# Patient Record
Sex: MALE | Race: WHITE | Employment: OTHER | URBAN - NONMETROPOLITAN AREA
[De-identification: names, ages, dates, MRNs, and addresses within clinical notes are randomized per-mention and may not be internally consistent; named-entity substitution may affect disease eponyms.]

---

## 2017-12-13 ENCOUNTER — HOSPITAL ENCOUNTER (OUTPATIENT)
Age: 27
Discharge: HOME | End: 2017-12-13
Payer: MEDICARE

## 2017-12-13 LAB — TSH SERPL DL<=0.05 MIU/L-ACNC: 1 UIU/ML (ref 0.4–4.2)

## 2017-12-13 PROCEDURE — 36415 COLL VENOUS BLD VENIPUNCTURE: CPT

## 2017-12-13 PROCEDURE — 84443 ASSAY THYROID STIM HORMONE: CPT

## 2018-01-17 ENCOUNTER — HOSPITAL ENCOUNTER (EMERGENCY)
Age: 28
Discharge: ANOTHER HOSPITAL | End: 2018-01-17
Payer: OTHER GOVERNMENT

## 2018-01-17 ENCOUNTER — APPOINTMENT (OUTPATIENT)
Dept: GENERAL RADIOLOGY | Age: 28
End: 2018-01-17
Payer: OTHER GOVERNMENT

## 2018-01-17 VITALS
WEIGHT: 178 LBS | HEART RATE: 72 BPM | RESPIRATION RATE: 17 BRPM | OXYGEN SATURATION: 97 % | TEMPERATURE: 98.7 F | BODY MASS INDEX: 27.94 KG/M2 | HEIGHT: 67 IN | DIASTOLIC BLOOD PRESSURE: 66 MMHG | SYSTOLIC BLOOD PRESSURE: 100 MMHG

## 2018-01-17 DIAGNOSIS — I20.0 UNSTABLE ANGINA (HCC): Primary | ICD-10-CM

## 2018-01-17 LAB
ANION GAP SERPL CALCULATED.3IONS-SCNC: 11 MEQ/L (ref 8–16)
APTT: 27.3 SECONDS (ref 22–38)
BASOPHILS # BLD: 0.3 %
BASOPHILS ABSOLUTE: 0 THOU/MM3 (ref 0–0.1)
BUN BLDV-MCNC: 13 MG/DL (ref 7–22)
CALCIUM SERPL-MCNC: 9.3 MG/DL (ref 8.5–10.5)
CHLORIDE BLD-SCNC: 103 MEQ/L (ref 98–111)
CO2: 24 MEQ/L (ref 23–33)
CREAT SERPL-MCNC: 1 MG/DL (ref 0.4–1.2)
EKG ATRIAL RATE: 72 BPM
EKG P AXIS: 20 DEGREES
EKG P-R INTERVAL: 182 MS
EKG Q-T INTERVAL: 396 MS
EKG QRS DURATION: 116 MS
EKG QTC CALCULATION (BAZETT): 433 MS
EKG R AXIS: -41 DEGREES
EKG T AXIS: 48 DEGREES
EKG VENTRICULAR RATE: 72 BPM
EOSINOPHIL # BLD: 2.7 %
EOSINOPHILS ABSOLUTE: 0.1 THOU/MM3 (ref 0–0.4)
GFR SERPL CREATININE-BSD FRML MDRD: 89 ML/MIN/1.73M2
GLUCOSE BLD-MCNC: 101 MG/DL (ref 70–108)
HCT VFR BLD CALC: 42.2 % (ref 42–52)
HEMOGLOBIN: 14.4 GM/DL (ref 14–18)
LYMPHOCYTES # BLD: 32 %
LYMPHOCYTES ABSOLUTE: 1.2 THOU/MM3 (ref 1–4.8)
MCH RBC QN AUTO: 32.9 PG (ref 27–31)
MCHC RBC AUTO-ENTMCNC: 34.3 GM/DL (ref 33–37)
MCV RBC AUTO: 96.1 FL (ref 80–94)
MONOCYTES # BLD: 7.6 %
MONOCYTES ABSOLUTE: 0.3 THOU/MM3 (ref 0.4–1.3)
NUCLEATED RED BLOOD CELLS: 0 /100 WBC
OSMOLALITY CALCULATION: 275.9 MOSMOL/KG (ref 275–300)
PDW BLD-RTO: 12.5 % (ref 11.5–14.5)
PLATELET # BLD: 263 THOU/MM3 (ref 130–400)
PMV BLD AUTO: 8.4 MCM (ref 7.4–10.4)
POTASSIUM SERPL-SCNC: 4.3 MEQ/L (ref 3.5–5.2)
RBC # BLD: 4.39 MILL/MM3 (ref 4.7–6.1)
SEG NEUTROPHILS: 57.4 %
SEGMENTED NEUTROPHILS ABSOLUTE COUNT: 2.2 THOU/MM3 (ref 1.8–7.7)
SODIUM BLD-SCNC: 138 MEQ/L (ref 135–145)
TROPONIN T: < 0.01 NG/ML
WBC # BLD: 3.9 THOU/MM3 (ref 4.8–10.8)

## 2018-01-17 PROCEDURE — 71045 X-RAY EXAM CHEST 1 VIEW: CPT

## 2018-01-17 PROCEDURE — 6370000000 HC RX 637 (ALT 250 FOR IP): Performed by: EMERGENCY MEDICINE

## 2018-01-17 PROCEDURE — 99285 EMERGENCY DEPT VISIT HI MDM: CPT

## 2018-01-17 PROCEDURE — 96365 THER/PROPH/DIAG IV INF INIT: CPT

## 2018-01-17 PROCEDURE — 96368 THER/DIAG CONCURRENT INF: CPT

## 2018-01-17 PROCEDURE — 85730 THROMBOPLASTIN TIME PARTIAL: CPT

## 2018-01-17 PROCEDURE — 80048 BASIC METABOLIC PNL TOTAL CA: CPT

## 2018-01-17 PROCEDURE — 93005 ELECTROCARDIOGRAM TRACING: CPT

## 2018-01-17 PROCEDURE — 2500000003 HC RX 250 WO HCPCS: Performed by: EMERGENCY MEDICINE

## 2018-01-17 PROCEDURE — 85025 COMPLETE CBC W/AUTO DIFF WBC: CPT

## 2018-01-17 PROCEDURE — 93000 ELECTROCARDIOGRAM COMPLETE: CPT | Performed by: EMERGENCY MEDICINE

## 2018-01-17 PROCEDURE — 96366 THER/PROPH/DIAG IV INF ADDON: CPT

## 2018-01-17 PROCEDURE — 84484 ASSAY OF TROPONIN QUANT: CPT

## 2018-01-17 PROCEDURE — 6360000002 HC RX W HCPCS: Performed by: EMERGENCY MEDICINE

## 2018-01-17 PROCEDURE — 36415 COLL VENOUS BLD VENIPUNCTURE: CPT

## 2018-01-17 RX ORDER — HEPARIN SODIUM 1000 [USP'U]/ML
4000 INJECTION, SOLUTION INTRAVENOUS; SUBCUTANEOUS PRN
Status: DISCONTINUED | OUTPATIENT
Start: 2018-01-17 | End: 2018-01-17 | Stop reason: HOSPADM

## 2018-01-17 RX ORDER — NITROGLYCERIN 20 MG/100ML
10 INJECTION INTRAVENOUS CONTINUOUS
Status: DISCONTINUED | OUTPATIENT
Start: 2018-01-17 | End: 2018-01-17 | Stop reason: HOSPADM

## 2018-01-17 RX ORDER — HEPARIN SODIUM 1000 [USP'U]/ML
4000 INJECTION, SOLUTION INTRAVENOUS; SUBCUTANEOUS ONCE
Status: COMPLETED | OUTPATIENT
Start: 2018-01-17 | End: 2018-01-17

## 2018-01-17 RX ORDER — HEPARIN SODIUM 1000 [USP'U]/ML
2000 INJECTION, SOLUTION INTRAVENOUS; SUBCUTANEOUS PRN
Status: DISCONTINUED | OUTPATIENT
Start: 2018-01-17 | End: 2018-01-17 | Stop reason: HOSPADM

## 2018-01-17 RX ORDER — HEPARIN SODIUM 10000 [USP'U]/100ML
12 INJECTION, SOLUTION INTRAVENOUS CONTINUOUS
Status: DISCONTINUED | OUTPATIENT
Start: 2018-01-17 | End: 2018-01-17 | Stop reason: HOSPADM

## 2018-01-17 RX ORDER — ASPIRIN 81 MG/1
243 TABLET, CHEWABLE ORAL ONCE
Status: COMPLETED | OUTPATIENT
Start: 2018-01-17 | End: 2018-01-17

## 2018-01-17 RX ADMIN — ASPIRIN 81 MG 243 MG: 81 TABLET ORAL at 13:11

## 2018-01-17 RX ADMIN — HEPARIN SODIUM 4000 UNITS: 1000 INJECTION, SOLUTION INTRAVENOUS; SUBCUTANEOUS at 13:38

## 2018-01-17 RX ADMIN — HEPARIN SODIUM AND DEXTROSE 12 UNITS/KG/HR: 10000; 5 INJECTION INTRAVENOUS at 13:38

## 2018-01-17 RX ADMIN — NITROGLYCERIN 10 MCG/MIN: 20 INJECTION INTRAVENOUS at 13:11

## 2018-01-17 ASSESSMENT — PAIN DESCRIPTION - LOCATION
LOCATION: CHEST

## 2018-01-17 ASSESSMENT — ENCOUNTER SYMPTOMS
ABDOMINAL PAIN: 0
WHEEZING: 0
COUGH: 0
SORE THROAT: 0
EYE DISCHARGE: 0
DIARRHEA: 0
EYE REDNESS: 0
NAUSEA: 0
SHORTNESS OF BREATH: 0
BACK PAIN: 0
RHINORRHEA: 0
VOMITING: 0

## 2018-01-17 ASSESSMENT — PAIN DESCRIPTION - PAIN TYPE
TYPE: ACUTE PAIN
TYPE: ACUTE PAIN;CHRONIC PAIN

## 2018-01-17 ASSESSMENT — PAIN SCALES - GENERAL
PAINLEVEL_OUTOF10: 7
PAINLEVEL_OUTOF10: 5
PAINLEVEL_OUTOF10: 7
PAINLEVEL_OUTOF10: 5

## 2018-01-17 NOTE — ED TRIAGE NOTES
Pt arrived to rm 05 per intake c/o chest pain that radiates into left shoulder. Pt reports that he has an extensive heart hx including CABG and stents. Pt took home NTG w/some relief- reports that he always has CP rates 4/10- today pain was much worse 7/10. Currently pt appears in no acute distress, VSS- family at bedside. Pt is followed by cardiology at [facility].

## 2018-01-17 NOTE — ED PROVIDER NOTES
patient for transfer. No beds available, will start on Heparin and Nitro until bed is available. PROCEDURES:  None     FINAL IMPRESSION      1. Unstable angina [facility])          DISPOSITION/PLAN   Transfer to [facility].  A bed was available and the patient was transported approximately 1540 on 1/17/18. The patient's nitro was titrated for pain relief vitals were stable at the time of the transfer. PATIENT REFERRED TO:  No follow-up provider specified. DISCHARGE MEDICATIONS:  Discharge Medication List as of 1/17/2018  3:41 PM          (Please note that portions of this note were completed with a voice recognition program.  Efforts were made to edit the dictations but occasionally words are mis-transcribed.)    Scribe:  Baljeet Holt 1/17/18 1:09 PM Scribing for and in the presence of ARJUN Oates. Signed by: Devyn De Paz, 01/17/18 3:59 PM    Provider:  I personally performed the services described in the documentation, reviewed and edited the documentation which was dictated to the scribe in my presence, and it accurately records my words and actions.     ARJUN Oates 1/17/18 3:59 PM        ARJUN Oates  01/17/18 [id number]

## 2018-02-21 ENCOUNTER — HOSPITAL ENCOUNTER (EMERGENCY)
Age: 28
Discharge: ANOTHER HOSPITAL | End: 2018-02-22
Attending: EMERGENCY MEDICINE
Payer: OTHER GOVERNMENT

## 2018-02-21 ENCOUNTER — APPOINTMENT (OUTPATIENT)
Dept: CT IMAGING | Age: 28
End: 2018-02-21
Payer: OTHER GOVERNMENT

## 2018-02-21 DIAGNOSIS — I20.0 UNSTABLE ANGINA (HCC): Primary | ICD-10-CM

## 2018-02-21 PROBLEM — I25.110 CORONARY ARTERY DISEASE INVOLVING NATIVE HEART WITH UNSTABLE ANGINA PECTORIS (HCC): Status: ACTIVE | Noted: 2018-01-17

## 2018-02-21 LAB
ALBUMIN SERPL-MCNC: 3.8 G/DL (ref 3.5–5.1)
ALP BLD-CCNC: 64 U/L (ref 38–126)
ALT SERPL-CCNC: 52 U/L (ref 11–66)
ANION GAP SERPL CALCULATED.3IONS-SCNC: 13 MEQ/L (ref 8–16)
APTT: 26.4 SECONDS (ref 22–38)
AST SERPL-CCNC: 20 U/L (ref 5–40)
BASOPHILS # BLD: 0.4 %
BASOPHILS ABSOLUTE: 0 THOU/MM3 (ref 0–0.1)
BILIRUB SERPL-MCNC: 0.3 MG/DL (ref 0.3–1.2)
BUN BLDV-MCNC: 18 MG/DL (ref 7–22)
CALCIUM SERPL-MCNC: 8.9 MG/DL (ref 8.5–10.5)
CHLORIDE BLD-SCNC: 104 MEQ/L (ref 98–111)
CO2: 22 MEQ/L (ref 23–33)
CREAT SERPL-MCNC: 0.8 MG/DL (ref 0.4–1.2)
EKG ATRIAL RATE: 71 BPM
EKG P AXIS: 30 DEGREES
EKG P-R INTERVAL: 180 MS
EKG Q-T INTERVAL: 392 MS
EKG QRS DURATION: 110 MS
EKG QTC CALCULATION (BAZETT): 425 MS
EKG R AXIS: -32 DEGREES
EKG T AXIS: 47 DEGREES
EKG VENTRICULAR RATE: 71 BPM
EOSINOPHIL # BLD: 3.1 %
EOSINOPHILS ABSOLUTE: 0.2 THOU/MM3 (ref 0–0.4)
GFR SERPL CREATININE-BSD FRML MDRD: > 90 ML/MIN/1.73M2
GLUCOSE BLD-MCNC: 117 MG/DL (ref 70–108)
HCT VFR BLD CALC: 38.3 % (ref 42–52)
HEMOGLOBIN: 13.3 GM/DL (ref 14–18)
INR BLD: 0.93 (ref 0.85–1.13)
LYMPHOCYTES # BLD: 28 %
LYMPHOCYTES ABSOLUTE: 1.9 THOU/MM3 (ref 1–4.8)
MCH RBC QN AUTO: 33.1 PG (ref 27–31)
MCHC RBC AUTO-ENTMCNC: 34.6 GM/DL (ref 33–37)
MCV RBC AUTO: 95.6 FL (ref 80–94)
MONOCYTES # BLD: 8.6 %
MONOCYTES ABSOLUTE: 0.6 THOU/MM3 (ref 0.4–1.3)
NUCLEATED RED BLOOD CELLS: 0 /100 WBC
OSMOLALITY CALCULATION: 280.5 MOSMOL/KG (ref 275–300)
PDW BLD-RTO: 12.7 % (ref 11.5–14.5)
PLATELET # BLD: 243 THOU/MM3 (ref 130–400)
PMV BLD AUTO: 8.9 FL (ref 7.4–10.4)
POTASSIUM SERPL-SCNC: 4.1 MEQ/L (ref 3.5–5.2)
PRO-BNP: 55.5 PG/ML (ref 0–450)
RBC # BLD: 4.01 MILL/MM3 (ref 4.7–6.1)
SEG NEUTROPHILS: 59.9 %
SEGMENTED NEUTROPHILS ABSOLUTE COUNT: 4.1 THOU/MM3 (ref 1.8–7.7)
SODIUM BLD-SCNC: 139 MEQ/L (ref 135–145)
TOTAL PROTEIN: 6.2 G/DL (ref 6.1–8)
TROPONIN T: < 0.01 NG/ML
WBC # BLD: 6.9 THOU/MM3 (ref 4.8–10.8)

## 2018-02-21 PROCEDURE — 84484 ASSAY OF TROPONIN QUANT: CPT

## 2018-02-21 PROCEDURE — 96366 THER/PROPH/DIAG IV INF ADDON: CPT

## 2018-02-21 PROCEDURE — 85610 PROTHROMBIN TIME: CPT

## 2018-02-21 PROCEDURE — 36415 COLL VENOUS BLD VENIPUNCTURE: CPT

## 2018-02-21 PROCEDURE — 96365 THER/PROPH/DIAG IV INF INIT: CPT

## 2018-02-21 PROCEDURE — 80053 COMPREHEN METABOLIC PANEL: CPT

## 2018-02-21 PROCEDURE — 85730 THROMBOPLASTIN TIME PARTIAL: CPT

## 2018-02-21 PROCEDURE — 2500000003 HC RX 250 WO HCPCS: Performed by: EMERGENCY MEDICINE

## 2018-02-21 PROCEDURE — 93005 ELECTROCARDIOGRAM TRACING: CPT | Performed by: EMERGENCY MEDICINE

## 2018-02-21 PROCEDURE — 83880 ASSAY OF NATRIURETIC PEPTIDE: CPT

## 2018-02-21 PROCEDURE — 85025 COMPLETE CBC W/AUTO DIFF WBC: CPT

## 2018-02-21 PROCEDURE — 99285 EMERGENCY DEPT VISIT HI MDM: CPT

## 2018-02-21 PROCEDURE — 6370000000 HC RX 637 (ALT 250 FOR IP): Performed by: EMERGENCY MEDICINE

## 2018-02-21 PROCEDURE — 6360000004 HC RX CONTRAST MEDICATION: Performed by: EMERGENCY MEDICINE

## 2018-02-21 PROCEDURE — 71275 CT ANGIOGRAPHY CHEST: CPT

## 2018-02-21 RX ORDER — ASPIRIN 81 MG/1
162 TABLET, CHEWABLE ORAL ONCE
Status: COMPLETED | OUTPATIENT
Start: 2018-02-21 | End: 2018-02-21

## 2018-02-21 RX ORDER — RANITIDINE 150 MG/1
150 CAPSULE ORAL ONCE
COMMUNITY
End: 2021-10-15 | Stop reason: ALTCHOICE

## 2018-02-21 RX ORDER — NITROGLYCERIN 20 MG/100ML
5 INJECTION INTRAVENOUS CONTINUOUS
Status: DISCONTINUED | OUTPATIENT
Start: 2018-02-21 | End: 2018-02-22 | Stop reason: HOSPADM

## 2018-02-21 RX ORDER — AMLODIPINE BESYLATE 10 MG/1
10 TABLET ORAL
COMMUNITY
Start: 2018-02-01 | End: 2018-04-17

## 2018-02-21 RX ORDER — FUROSEMIDE 20 MG/1
20 TABLET ORAL PRN
COMMUNITY

## 2018-02-21 RX ORDER — METOPROLOL TARTRATE 50 MG/1
50 TABLET, FILM COATED ORAL 2 TIMES DAILY
COMMUNITY

## 2018-02-21 RX ADMIN — NITROGLYCERIN 5 MCG/MIN: 20 INJECTION INTRAVENOUS at 22:21

## 2018-02-21 RX ADMIN — IOPAMIDOL 80 ML: 755 INJECTION, SOLUTION INTRAVENOUS at 22:37

## 2018-02-21 RX ADMIN — ASPIRIN 81 MG 162 MG: 81 TABLET ORAL at 22:21

## 2018-02-21 ASSESSMENT — ENCOUNTER SYMPTOMS
VOMITING: 0
NAUSEA: 0
SHORTNESS OF BREATH: 1
EYE ITCHING: 0
RHINORRHEA: 0
DIARRHEA: 0
WHEEZING: 0
ABDOMINAL PAIN: 0
ABDOMINAL DISTENTION: 0
EYE DISCHARGE: 0
COUGH: 0

## 2018-02-21 ASSESSMENT — PAIN DESCRIPTION - ORIENTATION: ORIENTATION: MID

## 2018-02-21 ASSESSMENT — PAIN DESCRIPTION - PAIN TYPE
TYPE: ACUTE PAIN
TYPE: ACUTE PAIN

## 2018-02-21 ASSESSMENT — PAIN SCALES - GENERAL
PAINLEVEL_OUTOF10: 9
PAINLEVEL_OUTOF10: 8

## 2018-02-21 ASSESSMENT — PAIN DESCRIPTION - LOCATION: LOCATION: CHEST

## 2018-02-22 VITALS
RESPIRATION RATE: 16 BRPM | DIASTOLIC BLOOD PRESSURE: 59 MMHG | OXYGEN SATURATION: 98 % | SYSTOLIC BLOOD PRESSURE: 113 MMHG | HEART RATE: 64 BPM | TEMPERATURE: 97.9 F

## 2018-02-22 PROCEDURE — 93010 ELECTROCARDIOGRAM REPORT: CPT | Performed by: INTERNAL MEDICINE

## 2018-02-22 PROCEDURE — 96366 THER/PROPH/DIAG IV INF ADDON: CPT

## 2018-02-22 NOTE — ED PROVIDER NOTES
Lesion #1: The mid/distal LAD was pre-dilated (BALL APEX PUSH OTW 1.5X20; BALL EMERGE RX 2.50 X 30) and treated with two overlapping stents (PROMUS 2.5 X 38;PROMUS 2.2 X 16). After deployment there was no residual stenosis, no evidence of dissection and FLORINDA III flow distally. Stents were post-dilated (BALL QUANTUM APEX 2.25X15;BALL QUANTUM APEX 2.50X12). · Lesion #2: The prox/mid LAD was pre-dilated (BALL APEX PUSH OTW 1.5X20;BALL EMERGE RX 2.50 X 30) and treated with DESx1 ( PROMUS 2.7 X 38). After deployment there was no residual stenosis, no evidence of dissection and FLORINDA III flow distally. Stent was post-dilated (BALL QUANTUM APEX 3.82D25TLWG QUANTUM APEX 3.00X20). REVIEW OF SYSTEMS     Review of Systems   Constitutional: Negative for activity change, appetite change, chills, fatigue and fever. HENT: Negative for congestion, ear discharge, hearing loss, nosebleeds and rhinorrhea. Eyes: Negative for discharge and itching. Respiratory: Positive for shortness of breath. Negative for cough and wheezing. Cardiovascular: Positive for chest pain. Gastrointestinal: Negative for abdominal distention, abdominal pain, diarrhea, nausea and vomiting. Endocrine: Negative for cold intolerance. Genitourinary: Negative for dysuria and frequency. Musculoskeletal: Negative for arthralgias, myalgias, neck pain and neck stiffness. Skin: Negative for rash and wound. Neurological: Positive for syncope. Negative for dizziness and weakness. Psychiatric/Behavioral: Negative for agitation and suicidal ideas. PAST MEDICAL HISTORY    has a past medical history of CAD (coronary artery disease); Hyperlipidemia; Hypertension; and Psychiatric problem. SURGICAL HISTORY      has a past surgical history that includes Cardiac surgery (07-); Coronary angioplasty with stent (09-); Elbow surgery (Left); Arm Surgery (Left); and fracture surgery.     CURRENT MEDICATIONS       Previous Medications    AMLODIPINE (NORVASC) 10 MG TABLET    Take 10 mg by mouth    ASPIRIN 81 MG TABLET    Take 81 mg by mouth daily. ATORVASTATIN (LIPITOR) 10 MG TABLET    Take 80 mg by mouth daily. FUROSEMIDE (LASIX) 20 MG TABLET    Take 20 mg by mouth as needed    ISOSORBIDE MONONITRATE (IMDUR) 30 MG CR TABLET    Take 120 mg by mouth daily     LISINOPRIL (PRINIVIL;ZESTRIL) 2.5 MG TABLET    Take 1 tablet by mouth daily. METOPROLOL TARTRATE (LOPRESSOR) 50 MG TABLET    Take 50 mg by mouth once    NITROGLYCERIN (NITROSTAT) 0.4 MG SL TABLET    Place 1 tablet under the tongue every 5 minutes as needed for Chest pain. RANITIDINE (ZANTAC) 150 MG CAPSULE    Take 150 mg by mouth once    RANOLAZINE (RANEXA) 500 MG SR TABLET    Take 1 tablet by mouth 2 times daily. TICAGRELOR (BRILINTA) 90 MG TABS TABLET    Take 90 mg by mouth 2 times daily. ALLERGIES     is allergic to pcn [penicillins]. FAMILY HISTORY     indicated that his mother is alive. He indicated that his father is alive. He indicated that his brother is alive. family history includes Arthritis in his father; Heart Disease in his father; High Blood Pressure in his father; High Cholesterol in his father. SOCIAL HISTORY      reports that he quit smoking about 3 years ago. He smoked 1.00 pack per day. He has never used smokeless tobacco. He reports that he does not drink alcohol or use drugs. PHYSICAL EXAM     INITIAL VITALS:  oral temperature is 97.9 °F (36.6 °C). His blood pressure is 113/59 (abnormal) and his pulse is 64. His respiration is 16 and oxygen saturation is 98%. Physical Exam   Constitutional: He is oriented to person, place, and time. He appears well-developed and well-nourished. HENT:   Head: Normocephalic and atraumatic. Eyes: Pupils are equal, round, and reactive to light. Right eye exhibits no discharge. Left eye exhibits no discharge. No scleral icterus. Neck: Normal range of motion. Neck supple.  No tracheal

## 2018-04-13 ENCOUNTER — HOSPITAL ENCOUNTER (OUTPATIENT)
Age: 28
Discharge: HOME | End: 2018-04-13
Payer: OTHER GOVERNMENT

## 2018-04-13 LAB
ALBUMIN SERPL-MCNC: 4.3 G/DL (ref 3.5–5.1)
ALP BLD-CCNC: 61 U/L (ref 38–126)
ALT SERPL-CCNC: 60 U/L (ref 11–66)
ANION GAP SERPL CALCULATED.3IONS-SCNC: 15 MEQ/L (ref 8–16)
AST SERPL-CCNC: 65 U/L (ref 5–40)
BILIRUB SERPL-MCNC: 0.6 MG/DL (ref 0.3–1.2)
BUN BLDV-MCNC: 21 MG/DL (ref 7–22)
CALCIUM SERPL-MCNC: 9.5 MG/DL (ref 8.5–10.5)
CHLORIDE BLD-SCNC: 101 MEQ/L (ref 98–111)
CHOLESTEROL, TOTAL: 109 MG/DL (ref 100–199)
CO2: 21 MEQ/L (ref 23–33)
CREAT SERPL-MCNC: 1.1 MG/DL (ref 0.4–1.2)
GFR SERPL CREATININE-BSD FRML MDRD: 79 ML/MIN/1.73M2
GLUCOSE BLD-MCNC: 92 MG/DL (ref 70–108)
HCT VFR BLD CALC: 48.4 % (ref 42–52)
HDLC SERPL-MCNC: 36 MG/DL
HEMOGLOBIN: 16.4 GM/DL (ref 14–18)
LDL CHOLESTEROL CALCULATED: 43 MG/DL
MCH RBC QN AUTO: 32.5 PG (ref 27–31)
MCHC RBC AUTO-ENTMCNC: 33.8 GM/DL (ref 33–37)
MCV RBC AUTO: 96.1 FL (ref 80–94)
PDW BLD-RTO: 12.5 % (ref 11.5–14.5)
PLATELET # BLD: 239 THOU/MM3 (ref 130–400)
PMV BLD AUTO: 10.3 FL (ref 7.4–10.4)
POTASSIUM SERPL-SCNC: 4.5 MEQ/L (ref 3.5–5.2)
PRO-BNP: 52.2 PG/ML (ref 0–450)
RBC # BLD: 5.03 MILL/MM3 (ref 4.7–6.1)
SODIUM BLD-SCNC: 137 MEQ/L (ref 135–145)
TOTAL PROTEIN: 7.5 G/DL (ref 6.1–8)
TRIGL SERPL-MCNC: 149 MG/DL (ref 0–199)
URIC ACID: 5 MG/DL (ref 3.7–7)
WBC # BLD: 5.1 THOU/MM3 (ref 4.8–10.8)

## 2018-04-13 PROCEDURE — 85027 COMPLETE CBC AUTOMATED: CPT

## 2018-04-13 PROCEDURE — 80061 LIPID PANEL: CPT

## 2018-04-13 PROCEDURE — 83006 GROWTH STIMULATION GENE 2: CPT

## 2018-04-13 PROCEDURE — 84550 ASSAY OF BLOOD/URIC ACID: CPT

## 2018-04-13 PROCEDURE — 36415 COLL VENOUS BLD VENIPUNCTURE: CPT

## 2018-04-13 PROCEDURE — 80053 COMPREHEN METABOLIC PANEL: CPT

## 2018-04-13 PROCEDURE — 83880 ASSAY OF NATRIURETIC PEPTIDE: CPT

## 2018-04-17 ENCOUNTER — HOSPITAL ENCOUNTER (EMERGENCY)
Age: 28
Discharge: ANOTHER HOSPITAL | End: 2018-04-18
Payer: OTHER GOVERNMENT

## 2018-04-17 ENCOUNTER — APPOINTMENT (OUTPATIENT)
Dept: GENERAL RADIOLOGY | Age: 28
End: 2018-04-17
Payer: OTHER GOVERNMENT

## 2018-04-17 DIAGNOSIS — R07.9 CHEST PAIN, UNSPECIFIED TYPE: Primary | ICD-10-CM

## 2018-04-17 LAB
ALBUMIN SERPL-MCNC: 3.8 G/DL (ref 3.5–5.1)
ALP BLD-CCNC: 64 U/L (ref 38–126)
ALT SERPL-CCNC: 47 U/L (ref 11–66)
ANION GAP SERPL CALCULATED.3IONS-SCNC: 12 MEQ/L (ref 8–16)
AST SERPL-CCNC: 29 U/L (ref 5–40)
BASOPHILS # BLD: 0.4 %
BASOPHILS ABSOLUTE: 0 THOU/MM3 (ref 0–0.1)
BILIRUB SERPL-MCNC: 0.3 MG/DL (ref 0.3–1.2)
BILIRUBIN DIRECT: < 0.2 MG/DL (ref 0–0.3)
BUN BLDV-MCNC: 22 MG/DL (ref 7–22)
CALCIUM SERPL-MCNC: 8.8 MG/DL (ref 8.5–10.5)
CHLORIDE BLD-SCNC: 103 MEQ/L (ref 98–111)
CO2: 25 MEQ/L (ref 23–33)
CREAT SERPL-MCNC: 1.1 MG/DL (ref 0.4–1.2)
EKG ATRIAL RATE: 64 BPM
EKG P AXIS: 25 DEGREES
EKG P-R INTERVAL: 176 MS
EKG Q-T INTERVAL: 408 MS
EKG QRS DURATION: 114 MS
EKG QTC CALCULATION (BAZETT): 420 MS
EKG R AXIS: -47 DEGREES
EKG T AXIS: 57 DEGREES
EKG VENTRICULAR RATE: 64 BPM
EOSINOPHIL # BLD: 3.5 %
EOSINOPHILS ABSOLUTE: 0.2 THOU/MM3 (ref 0–0.4)
GFR SERPL CREATININE-BSD FRML MDRD: 79 ML/MIN/1.73M2
GLUCOSE BLD-MCNC: 103 MG/DL (ref 70–108)
HCT VFR BLD CALC: 43.8 % (ref 42–52)
HEMOGLOBIN: 15.1 GM/DL (ref 14–18)
LIPASE: 35.7 U/L (ref 5.6–51.3)
LYMPHOCYTES # BLD: 34.3 %
LYMPHOCYTES ABSOLUTE: 2.2 THOU/MM3 (ref 1–4.8)
MCH RBC QN AUTO: 32.8 PG (ref 27–31)
MCHC RBC AUTO-ENTMCNC: 34.5 GM/DL (ref 33–37)
MCV RBC AUTO: 95 FL (ref 80–94)
MISC. #1 REFERENCE GROUP TEST: NORMAL
MONOCYTES # BLD: 8 %
MONOCYTES ABSOLUTE: 0.5 THOU/MM3 (ref 0.4–1.3)
NUCLEATED RED BLOOD CELLS: 0 /100 WBC
OSMOLALITY CALCULATION: 283 MOSMOL/KG (ref 275–300)
PDW BLD-RTO: 12.5 % (ref 11.5–14.5)
PLATELET # BLD: 253 THOU/MM3 (ref 130–400)
PMV BLD AUTO: 9.7 FL (ref 7.4–10.4)
POTASSIUM SERPL-SCNC: 4.2 MEQ/L (ref 3.5–5.2)
PRO-BNP: 61.6 PG/ML (ref 0–450)
RBC # BLD: 4.61 MILL/MM3 (ref 4.7–6.1)
SEG NEUTROPHILS: 53.8 %
SEGMENTED NEUTROPHILS ABSOLUTE COUNT: 3.4 THOU/MM3 (ref 1.8–7.7)
SODIUM BLD-SCNC: 140 MEQ/L (ref 135–145)
TOTAL PROTEIN: 6.3 G/DL (ref 6.1–8)
TROPONIN T: < 0.01 NG/ML
WBC # BLD: 6.4 THOU/MM3 (ref 4.8–10.8)

## 2018-04-17 PROCEDURE — 80053 COMPREHEN METABOLIC PANEL: CPT

## 2018-04-17 PROCEDURE — 83690 ASSAY OF LIPASE: CPT

## 2018-04-17 PROCEDURE — 82248 BILIRUBIN DIRECT: CPT

## 2018-04-17 PROCEDURE — 85025 COMPLETE CBC W/AUTO DIFF WBC: CPT

## 2018-04-17 PROCEDURE — 96365 THER/PROPH/DIAG IV INF INIT: CPT

## 2018-04-17 PROCEDURE — 83880 ASSAY OF NATRIURETIC PEPTIDE: CPT

## 2018-04-17 PROCEDURE — 93005 ELECTROCARDIOGRAM TRACING: CPT | Performed by: FAMILY MEDICINE

## 2018-04-17 PROCEDURE — 96366 THER/PROPH/DIAG IV INF ADDON: CPT

## 2018-04-17 PROCEDURE — 71045 X-RAY EXAM CHEST 1 VIEW: CPT

## 2018-04-17 PROCEDURE — 99285 EMERGENCY DEPT VISIT HI MDM: CPT

## 2018-04-17 PROCEDURE — 36415 COLL VENOUS BLD VENIPUNCTURE: CPT

## 2018-04-17 PROCEDURE — 84484 ASSAY OF TROPONIN QUANT: CPT

## 2018-04-17 PROCEDURE — 6370000000 HC RX 637 (ALT 250 FOR IP): Performed by: PHYSICIAN ASSISTANT

## 2018-04-17 PROCEDURE — 2500000003 HC RX 250 WO HCPCS: Performed by: PHYSICIAN ASSISTANT

## 2018-04-17 RX ORDER — FAMOTIDINE 20 MG/1
20 TABLET, FILM COATED ORAL DAILY
Status: DISCONTINUED | OUTPATIENT
Start: 2018-04-18 | End: 2018-04-18 | Stop reason: HOSPADM

## 2018-04-17 RX ORDER — NITROGLYCERIN 20 MG/100ML
5 INJECTION INTRAVENOUS CONTINUOUS
Status: DISCONTINUED | OUTPATIENT
Start: 2018-04-17 | End: 2018-04-18 | Stop reason: HOSPADM

## 2018-04-17 RX ORDER — ASPIRIN 81 MG/1
263 TABLET, CHEWABLE ORAL ONCE
Status: COMPLETED | OUTPATIENT
Start: 2018-04-17 | End: 2018-04-17

## 2018-04-17 RX ORDER — RANOLAZINE 500 MG/1
1000 TABLET, EXTENDED RELEASE ORAL 2 TIMES DAILY
Status: DISCONTINUED | OUTPATIENT
Start: 2018-04-18 | End: 2018-04-18 | Stop reason: HOSPADM

## 2018-04-17 RX ADMIN — ASPIRIN 81 MG 243 MG: 81 TABLET ORAL at 21:23

## 2018-04-17 RX ADMIN — NITROGLYCERIN 5 MCG/MIN: 20 INJECTION INTRAVENOUS at 21:23

## 2018-04-17 ASSESSMENT — PAIN DESCRIPTION - ORIENTATION: ORIENTATION: MID

## 2018-04-17 ASSESSMENT — PAIN DESCRIPTION - DESCRIPTORS: DESCRIPTORS: SHARP

## 2018-04-17 ASSESSMENT — PAIN DESCRIPTION - LOCATION: LOCATION: CHEST;JAW;BACK

## 2018-04-17 ASSESSMENT — PAIN DESCRIPTION - FREQUENCY: FREQUENCY: CONTINUOUS

## 2018-04-17 ASSESSMENT — PAIN SCALES - GENERAL
PAINLEVEL_OUTOF10: 7
PAINLEVEL_OUTOF10: 8
PAINLEVEL_OUTOF10: 8
PAINLEVEL_OUTOF10: 7

## 2018-04-17 ASSESSMENT — ENCOUNTER SYMPTOMS
VOMITING: 0
COUGH: 0
RHINORRHEA: 0
EYE REDNESS: 0
EYE DISCHARGE: 0
WHEEZING: 0
BACK PAIN: 0
ABDOMINAL PAIN: 0
SHORTNESS OF BREATH: 1
DIARRHEA: 0
NAUSEA: 0
SORE THROAT: 0

## 2018-04-17 ASSESSMENT — PAIN DESCRIPTION - PAIN TYPE
TYPE: ACUTE PAIN
TYPE: ACUTE PAIN

## 2018-04-17 ASSESSMENT — PAIN DESCRIPTION - ONSET: ONSET: ON-GOING

## 2018-04-18 VITALS
SYSTOLIC BLOOD PRESSURE: 119 MMHG | OXYGEN SATURATION: 98 % | RESPIRATION RATE: 20 BRPM | BODY MASS INDEX: 28.19 KG/M2 | TEMPERATURE: 98.4 F | DIASTOLIC BLOOD PRESSURE: 72 MMHG | HEART RATE: 53 BPM | WEIGHT: 180 LBS

## 2018-04-18 PROCEDURE — 96375 TX/PRO/DX INJ NEW DRUG ADDON: CPT

## 2018-04-18 PROCEDURE — 6360000002 HC RX W HCPCS: Performed by: PHYSICIAN ASSISTANT

## 2018-04-18 RX ORDER — MORPHINE SULFATE 4 MG/ML
4 INJECTION, SOLUTION INTRAMUSCULAR; INTRAVENOUS ONCE
Status: COMPLETED | OUTPATIENT
Start: 2018-04-18 | End: 2018-04-18

## 2018-04-18 RX ORDER — ONDANSETRON 4 MG/1
4 TABLET, ORALLY DISINTEGRATING ORAL ONCE
Status: COMPLETED | OUTPATIENT
Start: 2018-04-18 | End: 2018-04-18

## 2018-04-18 RX ADMIN — MORPHINE SULFATE 4 MG: 4 INJECTION, SOLUTION INTRAMUSCULAR; INTRAVENOUS at 00:27

## 2018-04-18 RX ADMIN — ONDANSETRON 4 MG: 4 TABLET, ORALLY DISINTEGRATING ORAL at 00:27

## 2018-04-18 ASSESSMENT — PAIN SCALES - GENERAL: PAINLEVEL_OUTOF10: 7

## 2018-08-01 ENCOUNTER — APPOINTMENT (OUTPATIENT)
Dept: GENERAL RADIOLOGY | Age: 28
End: 2018-08-01
Payer: OTHER GOVERNMENT

## 2018-08-01 ENCOUNTER — HOSPITAL ENCOUNTER (EMERGENCY)
Age: 28
Discharge: ANOTHER HOSPITAL | End: 2018-08-01
Payer: OTHER GOVERNMENT

## 2018-08-01 VITALS
HEART RATE: 78 BPM | WEIGHT: 180 LBS | TEMPERATURE: 97.9 F | SYSTOLIC BLOOD PRESSURE: 127 MMHG | DIASTOLIC BLOOD PRESSURE: 72 MMHG | OXYGEN SATURATION: 98 % | RESPIRATION RATE: 18 BRPM | HEIGHT: 68 IN | BODY MASS INDEX: 27.28 KG/M2

## 2018-08-01 DIAGNOSIS — R07.9 CHEST PAIN, UNSPECIFIED TYPE: Primary | ICD-10-CM

## 2018-08-01 LAB
ALBUMIN SERPL-MCNC: 3.8 G/DL (ref 3.5–5.1)
ALP BLD-CCNC: 52 U/L (ref 38–126)
ALT SERPL-CCNC: 51 U/L (ref 11–66)
AMPHETAMINE+METHAMPHETAMINE URINE SCREEN: NEGATIVE
ANION GAP SERPL CALCULATED.3IONS-SCNC: 14 MEQ/L (ref 8–16)
AST SERPL-CCNC: 30 U/L (ref 5–40)
BARBITURATE QUANTITATIVE URINE: NEGATIVE
BASOPHILS # BLD: 0.3 %
BASOPHILS ABSOLUTE: 0 THOU/MM3 (ref 0–0.1)
BENZODIAZEPINE QUANTITATIVE URINE: NEGATIVE
BILIRUB SERPL-MCNC: 0.4 MG/DL (ref 0.3–1.2)
BILIRUBIN DIRECT: < 0.2 MG/DL (ref 0–0.3)
BILIRUBIN URINE: NEGATIVE
BLOOD, URINE: NEGATIVE
BUN BLDV-MCNC: 16 MG/DL (ref 7–22)
CALCIUM SERPL-MCNC: 9.1 MG/DL (ref 8.5–10.5)
CANNABINOID QUANTITATIVE URINE: NEGATIVE
CHARACTER, URINE: CLEAR
CHLORIDE BLD-SCNC: 103 MEQ/L (ref 98–111)
CO2: 22 MEQ/L (ref 23–33)
COCAINE METABOLITE QUANTITATIVE URINE: NEGATIVE
COLOR: YELLOW
CREAT SERPL-MCNC: 1.1 MG/DL (ref 0.4–1.2)
EKG ATRIAL RATE: 80 BPM
EKG P AXIS: 41 DEGREES
EKG P-R INTERVAL: 166 MS
EKG Q-T INTERVAL: 394 MS
EKG QRS DURATION: 116 MS
EKG QTC CALCULATION (BAZETT): 454 MS
EKG R AXIS: -50 DEGREES
EKG T AXIS: 72 DEGREES
EKG VENTRICULAR RATE: 80 BPM
EOSINOPHIL # BLD: 1.7 %
EOSINOPHILS ABSOLUTE: 0.1 THOU/MM3 (ref 0–0.4)
ERYTHROCYTE [DISTWIDTH] IN BLOOD BY AUTOMATED COUNT: 11.9 % (ref 11.5–14.5)
ERYTHROCYTE [DISTWIDTH] IN BLOOD BY AUTOMATED COUNT: 42.4 FL (ref 35–45)
GFR SERPL CREATININE-BSD FRML MDRD: 79 ML/MIN/1.73M2
GLUCOSE BLD-MCNC: 120 MG/DL (ref 70–108)
GLUCOSE URINE: NEGATIVE MG/DL
HCT VFR BLD CALC: 42.2 % (ref 42–52)
HEMOGLOBIN: 15.2 GM/DL (ref 14–18)
IMMATURE GRANS (ABS): 0.01 THOU/MM3 (ref 0–0.07)
IMMATURE GRANULOCYTES: 0.2 %
KETONES, URINE: NEGATIVE
LEUKOCYTE ESTERASE, URINE: NEGATIVE
LIPASE: 41.2 U/L (ref 5.6–51.3)
LYMPHOCYTES # BLD: 23.9 %
LYMPHOCYTES ABSOLUTE: 1.4 THOU/MM3 (ref 1–4.8)
MCH RBC QN AUTO: 34.9 PG (ref 26–33)
MCHC RBC AUTO-ENTMCNC: 36 GM/DL (ref 32.2–35.5)
MCV RBC AUTO: 96.8 FL (ref 80–94)
MONOCYTES # BLD: 7.2 %
MONOCYTES ABSOLUTE: 0.4 THOU/MM3 (ref 0.4–1.3)
NITRITE, URINE: NEGATIVE
NUCLEATED RED BLOOD CELLS: 0 /100 WBC
OPIATES, URINE: NEGATIVE
OSMOLALITY CALCULATION: 279.9 MOSMOL/KG (ref 275–300)
OXYCODONE: NEGATIVE
PH UA: 6.5
PHENCYCLIDINE QUANTITATIVE URINE: NEGATIVE
PLATELET # BLD: 249 THOU/MM3 (ref 130–400)
PMV BLD AUTO: 10.7 FL (ref 9.4–12.4)
POTASSIUM REFLEX MAGNESIUM: 4 MEQ/L (ref 3.5–5.2)
PROTEIN UA: NEGATIVE
RBC # BLD: 4.36 MILL/MM3 (ref 4.7–6.1)
SEG NEUTROPHILS: 66.7 %
SEGMENTED NEUTROPHILS ABSOLUTE COUNT: 3.9 THOU/MM3 (ref 1.8–7.7)
SODIUM BLD-SCNC: 139 MEQ/L (ref 135–145)
SPECIFIC GRAVITY, URINE: 1.02 (ref 1–1.03)
TOTAL PROTEIN: 6.6 G/DL (ref 6.1–8)
TROPONIN T: < 0.01 NG/ML
TROPONIN T: < 0.01 NG/ML
UROBILINOGEN, URINE: 1 EU/DL
WBC # BLD: 5.9 THOU/MM3 (ref 4.8–10.8)

## 2018-08-01 PROCEDURE — 80076 HEPATIC FUNCTION PANEL: CPT

## 2018-08-01 PROCEDURE — 6360000002 HC RX W HCPCS: Performed by: STUDENT IN AN ORGANIZED HEALTH CARE EDUCATION/TRAINING PROGRAM

## 2018-08-01 PROCEDURE — 6370000000 HC RX 637 (ALT 250 FOR IP): Performed by: STUDENT IN AN ORGANIZED HEALTH CARE EDUCATION/TRAINING PROGRAM

## 2018-08-01 PROCEDURE — 81003 URINALYSIS AUTO W/O SCOPE: CPT

## 2018-08-01 PROCEDURE — 36415 COLL VENOUS BLD VENIPUNCTURE: CPT

## 2018-08-01 PROCEDURE — 80307 DRUG TEST PRSMV CHEM ANLYZR: CPT

## 2018-08-01 PROCEDURE — 85025 COMPLETE CBC W/AUTO DIFF WBC: CPT

## 2018-08-01 PROCEDURE — 93010 ELECTROCARDIOGRAM REPORT: CPT | Performed by: INTERNAL MEDICINE

## 2018-08-01 PROCEDURE — 99285 EMERGENCY DEPT VISIT HI MDM: CPT

## 2018-08-01 PROCEDURE — 71045 X-RAY EXAM CHEST 1 VIEW: CPT

## 2018-08-01 PROCEDURE — 96365 THER/PROPH/DIAG IV INF INIT: CPT

## 2018-08-01 PROCEDURE — 84484 ASSAY OF TROPONIN QUANT: CPT

## 2018-08-01 PROCEDURE — 2500000003 HC RX 250 WO HCPCS: Performed by: STUDENT IN AN ORGANIZED HEALTH CARE EDUCATION/TRAINING PROGRAM

## 2018-08-01 PROCEDURE — 93005 ELECTROCARDIOGRAM TRACING: CPT | Performed by: FAMILY MEDICINE

## 2018-08-01 PROCEDURE — 80048 BASIC METABOLIC PNL TOTAL CA: CPT

## 2018-08-01 PROCEDURE — 83690 ASSAY OF LIPASE: CPT

## 2018-08-01 PROCEDURE — 96366 THER/PROPH/DIAG IV INF ADDON: CPT

## 2018-08-01 RX ORDER — HYDROCODONE BITARTRATE AND ACETAMINOPHEN 5; 325 MG/1; MG/1
1 TABLET ORAL ONCE
Status: COMPLETED | OUTPATIENT
Start: 2018-08-01 | End: 2018-08-01

## 2018-08-01 RX ORDER — MORPHINE SULFATE 4 MG/ML
4 INJECTION, SOLUTION INTRAMUSCULAR; INTRAVENOUS ONCE
Status: COMPLETED | OUTPATIENT
Start: 2018-08-01 | End: 2018-08-01

## 2018-08-01 RX ORDER — ALLOPURINOL 100 MG/1
100 TABLET ORAL DAILY
COMMUNITY

## 2018-08-01 RX ORDER — NITROGLYCERIN 20 MG/100ML
5 INJECTION INTRAVENOUS CONTINUOUS
Status: DISCONTINUED | OUTPATIENT
Start: 2018-08-01 | End: 2018-08-01 | Stop reason: HOSPADM

## 2018-08-01 RX ADMIN — HYDROCODONE BITARTRATE AND ACETAMINOPHEN 1 TABLET: 5; 325 TABLET ORAL at 18:01

## 2018-08-01 RX ADMIN — NITROGLYCERIN 5 MCG/MIN: 20 INJECTION INTRAVENOUS at 16:06

## 2018-08-01 RX ADMIN — MORPHINE SULFATE 4 MG: 4 INJECTION, SOLUTION INTRAMUSCULAR; INTRAVENOUS at 19:11

## 2018-08-01 ASSESSMENT — ENCOUNTER SYMPTOMS
SHORTNESS OF BREATH: 1
VOMITING: 0
SORE THROAT: 0
ABDOMINAL PAIN: 0
BACK PAIN: 1
WHEEZING: 0
COUGH: 0
RHINORRHEA: 0
EYE DISCHARGE: 0
EYE REDNESS: 0
NAUSEA: 0
DIARRHEA: 0

## 2018-08-01 ASSESSMENT — PAIN SCALES - GENERAL
PAINLEVEL_OUTOF10: 8

## 2018-08-01 ASSESSMENT — HEART SCORE: ECG: 0

## 2018-08-01 ASSESSMENT — PAIN DESCRIPTION - DESCRIPTORS: DESCRIPTORS: STABBING

## 2018-08-01 ASSESSMENT — PAIN DESCRIPTION - ORIENTATION: ORIENTATION: LEFT;MID

## 2018-08-01 ASSESSMENT — PAIN DESCRIPTION - LOCATION: LOCATION: CHEST

## 2018-08-01 ASSESSMENT — PAIN DESCRIPTION - PAIN TYPE: TYPE: ACUTE PAIN

## 2018-08-01 ASSESSMENT — PAIN DESCRIPTION - FREQUENCY: FREQUENCY: CONTINUOUS

## 2018-08-01 NOTE — ED NOTES
Pt ambulated to and from bathroom with stand by assistance. Urine sample sent to lab.      June Cool RN  08/01/18 [id number]

## 2018-08-01 NOTE — ED TRIAGE NOTES
Patient presents to ER with complaints of chest pain and shortness of breath that began this morning at 1000. Patient reports taking 2 doses of nitro, with the last dose being 10 to 15 minutes prior to arrival. Patient states his pain is radiating up into the left jaw and down left arm.

## 2018-08-01 NOTE — ED PROVIDER NOTES
[facility]  eMERGENCY dEPARTMENT eNCOUnter          [address] [facility]       Chief Complaint   Patient presents with    Chest Pain    Shortness of Breath       Nurses Notes reviewed and I agree except as noted in the HPI. HISTORY OF PRESENT ILLNESS    Boris Chery is a 28 y.o. male who presents to the Emergency Department for the evaluation of chest pain and shortness of breath. The pain began at 1030 and has gradually worsened ever since. It is located in the mid-left section of his chest, radiating into his back, up to his left jaw and down his left arm. Left arm pain is a new symptom. He describes it as sharp and constant in character. Patient attempted to take aspirin and nitro to treat his pain prior to arrival with no relief. Mother reports that he usually has episodes like this every 8 weeks. He has an extensive cardiac history including a CABG 4 years ago with 23 stent placements since. His cardiac issues are monitored by Dr. Edd Davison at [facility]. He is in the process of being put on the transplant list. He explains that usually when this happens, he is placed on a nitro drip and transferred to [facility]. He denies any diaphoresis, leg swelling or unexpected weight gain. No further complaints. The HPI was provided by the patient and his mother. REVIEW OF SYSTEMS     Review of Systems   Constitutional: Negative for appetite change, chills, fatigue and fever. HENT: Negative for congestion, ear pain, rhinorrhea and sore throat. Left jaw pain   Eyes: Negative for discharge, redness and visual disturbance. Respiratory: Positive for shortness of breath. Negative for cough and wheezing. Cardiovascular: Positive for chest pain (middle-left). Negative for palpitations and leg swelling. Gastrointestinal: Negative for abdominal pain, diarrhea, nausea and vomiting. Genitourinary: Negative for decreased urine volume, difficulty urinating, dysuria and hematuria.    Musculoskeletal: verbal subscore is 5. GCS motor subscore is 6. Skin: Skin is warm and dry. He is not diaphoretic. No erythema. Psychiatric: He has a normal mood and affect. His behavior is normal.   Nursing note and vitals reviewed. DIFFERENTIAL DIAGNOSIS:   ACS, unstable angina, pneumonia    DIAGNOSTIC RESULTS     EKG: All EKG's are interpreted by the Emergency Department Physician who either signs or Co-signs this chart in the absence of a cardiologist.  Collection Time Result Time Ventricular Rate Atrial Rate P-R Interval QRS Duration Q-T Interval QTc Calculation (Bazett) P Axis R Axis T Axis   08/01/18 15:00:57 08/01/18 15:00:57 80 80 166 116 394 454 41 -50 72       Final result                Narrative:    Normal sinus rhythm  Left anterior fascicular block  Inferior-posterior infarct (cited on or before 02-AUG-2014)  Abnormal ECG  When compared with ECG of 17-APR-2018 20:35,  No significant change was found  Confirmed by Bienvenido Gunn ([id number]) on 8/1/2018 6:53:42 PM                      RADIOLOGY: non-plain film images(s) such as CT, Ultrasound and MRI are read by the radiologist.  XR CHEST PORTABLE   Final Result   1. Mild cardiomegaly. Metallic sternotomy sutures and vascular clips from prior surgery. There appear to be a few coronary artery stents projected over left side of the cardiac silhouette. 2. No acute findings. No infiltrates or effusions are seen. .            **This report has been created using voice recognition software. It may contain minor errors which are inherent in voice recognition technology. **      Final report electronically signed by Dr. Ignacio Moran on 8/1/2018 4:16 PM          LABS:   Labs Reviewed   CBC WITH AUTO DIFFERENTIAL - Abnormal; Notable for the following:        Result Value    RBC 4.36 (*)     MCV 96.8 (*)     MCH 34.9 (*)     MCHC 36.0 (*)     All other components within normal limits   BASIC METABOLIC PANEL W/ REFLEX TO MG FOR LOW K  - Abnormal; Notable for the following: CO2 22 (*)     Glucose 120 (*)     All other components within normal limits   GLOMERULAR FILTRATION RATE, ESTIMATED - Abnormal; Notable for the following:     Est, Glom Filt Rate 79 (*)     All other components within normal limits   TROPONIN   TROPONIN   URINE DRUG SCREEN   URINE RT REFLEX TO CULTURE   LIPASE   HEPATIC FUNCTION PANEL   ANION GAP   OSMOLALITY       EMERGENCY DEPARTMENT COURSE:   Vitals:    Vitals:    08/01/18 1506 08/01/18 1617 08/01/18 1745 08/01/18 1802   BP: 130/79 122/68 (!) 114/59 127/72   Pulse: 82 71 71 78   Resp: 20 17 18 18   Temp: 97.9 °F (36.6 °C)      TempSrc: Oral      SpO2: 96% 96% 96% 98%   Weight: 180 lb (81.6 kg)      Height: 5' 8\" (1.727 m)          3:54 PM: The patient was seen and evaluated. 2 negative troponins with NSR EKG and CXR showing cardiomegaly with sternotomy sutures and vascular clips and stents without infiltrates or effusions. Patient has documented CTa with no aneurysm seen on 2/21/2018. Heart rate 78, respirations unlabored and 98% room air, no palpitations, no hemoptysis. No pulse deficit. Wells score 0. Has had chest pain of this nature on many occasions, history of 4 CABG and 32 stents and is seen at [address] [place] [address]. Dr. Karis Romo is called, suggests direct admit to [address] [place] [address]. Patient placed on Nitro drip without significant change to blood pressure but pain relief is not achieved. Norco given. Patient is transferred; given morphine en route due to pain continuing. Patient will be transferred for further management and care. Patient is agreeable with this plan. CRITICAL CARE:   None    CONSULTS:  Dr. Karis Romo, [facility]    PROCEDURES:  None     FINAL IMPRESSION      1. Chest pain, unspecified type          DISPOSITION/PLAN   transfer    PATIENT REFERRED TO:  No follow-up provider specified.     DISCHARGE MEDICATIONS:  Discharge Medication List as of 8/1/2018  6:51 PM          (Please note that portions of this note were completed with a voice recognition program.  Efforts were made to edit the dictations but occasionally words are mis-transcribed.)    Scribe:  Sachi Alvarez 8/1/18 3:54 PM Scribing for and in the presence of Gissel Vargas PA-C. Signed by: Devyn Anton, 8/1/18 12:41 AM    Provider:  I personally performed the services described in the documentation, reviewed and edited the documentation which was dictated to the scribe in my presence, and it accurately records my words and actions.     Gissel Vargas PA-C 8/1/18 12:41 AM            Xi Ramirez PA-C  08/02/18 [id number]

## 2018-08-01 NOTE — ED NOTES
Pt accepted as a direct admit to [address] [place] [address], bed assignment is [address] [place], Bed 2048. [facility], ETA for transport will be [phone]. Pt complaining of pain rated \"8\" out of 10. Nitro drip currently infusing at 20 mcg.      Jb Mccracken RN  08/01/18 3922

## 2018-10-23 ENCOUNTER — APPOINTMENT (OUTPATIENT)
Dept: GENERAL RADIOLOGY | Age: 28
End: 2018-10-23
Payer: OTHER GOVERNMENT

## 2018-10-23 ENCOUNTER — HOSPITAL ENCOUNTER (EMERGENCY)
Age: 28
Discharge: ANOTHER HOSPITAL | End: 2018-10-23
Attending: EMERGENCY MEDICINE
Payer: OTHER GOVERNMENT

## 2018-10-23 VITALS
SYSTOLIC BLOOD PRESSURE: 117 MMHG | OXYGEN SATURATION: 97 % | DIASTOLIC BLOOD PRESSURE: 58 MMHG | HEART RATE: 74 BPM | RESPIRATION RATE: 18 BRPM | TEMPERATURE: 98.3 F

## 2018-10-23 DIAGNOSIS — R07.9 CHEST PAIN, UNSPECIFIED TYPE: Primary | ICD-10-CM

## 2018-10-23 LAB
ANION GAP SERPL CALCULATED.3IONS-SCNC: 15 MEQ/L (ref 8–16)
BASOPHILS # BLD: 0.3 %
BASOPHILS ABSOLUTE: 0 THOU/MM3 (ref 0–0.1)
BUN BLDV-MCNC: 17 MG/DL (ref 7–22)
CALCIUM SERPL-MCNC: 9.6 MG/DL (ref 8.5–10.5)
CHLORIDE BLD-SCNC: 100 MEQ/L (ref 98–111)
CO2: 23 MEQ/L (ref 23–33)
CREAT SERPL-MCNC: 1 MG/DL (ref 0.4–1.2)
EKG ATRIAL RATE: 90 BPM
EKG P AXIS: 51 DEGREES
EKG P-R INTERVAL: 158 MS
EKG Q-T INTERVAL: 386 MS
EKG QRS DURATION: 112 MS
EKG QTC CALCULATION (BAZETT): 472 MS
EKG R AXIS: -40 DEGREES
EKG T AXIS: 45 DEGREES
EKG VENTRICULAR RATE: 90 BPM
EOSINOPHIL # BLD: 1.3 %
EOSINOPHILS ABSOLUTE: 0.1 THOU/MM3 (ref 0–0.4)
ERYTHROCYTE [DISTWIDTH] IN BLOOD BY AUTOMATED COUNT: 12.2 % (ref 11.5–14.5)
ERYTHROCYTE [DISTWIDTH] IN BLOOD BY AUTOMATED COUNT: 42.2 FL (ref 35–45)
GFR SERPL CREATININE-BSD FRML MDRD: 88 ML/MIN/1.73M2
GLUCOSE BLD-MCNC: 132 MG/DL (ref 70–108)
HCT VFR BLD CALC: 43.4 % (ref 42–52)
HEMOGLOBIN: 15.6 GM/DL (ref 14–18)
IMMATURE GRANS (ABS): 0.01 THOU/MM3 (ref 0–0.07)
IMMATURE GRANULOCYTES: 0.2 %
LYMPHOCYTES # BLD: 22 %
LYMPHOCYTES ABSOLUTE: 1.4 THOU/MM3 (ref 1–4.8)
MCH RBC QN AUTO: 34.1 PG (ref 26–33)
MCHC RBC AUTO-ENTMCNC: 35.9 GM/DL (ref 32.2–35.5)
MCV RBC AUTO: 95 FL (ref 80–94)
MONOCYTES # BLD: 6.7 %
MONOCYTES ABSOLUTE: 0.4 THOU/MM3 (ref 0.4–1.3)
NUCLEATED RED BLOOD CELLS: 0 /100 WBC
OSMOLALITY CALCULATION: 279.1 MOSMOL/KG (ref 275–300)
PLATELET # BLD: 253 THOU/MM3 (ref 130–400)
PMV BLD AUTO: 10.5 FL (ref 9.4–12.4)
POTASSIUM SERPL-SCNC: 3.7 MEQ/L (ref 3.5–5.2)
RBC # BLD: 4.57 MILL/MM3 (ref 4.7–6.1)
SEG NEUTROPHILS: 69.5 %
SEGMENTED NEUTROPHILS ABSOLUTE COUNT: 4.4 THOU/MM3 (ref 1.8–7.7)
SODIUM BLD-SCNC: 138 MEQ/L (ref 135–145)
TROPONIN T: < 0.01 NG/ML
WBC # BLD: 6.4 THOU/MM3 (ref 4.8–10.8)

## 2018-10-23 PROCEDURE — 71045 X-RAY EXAM CHEST 1 VIEW: CPT

## 2018-10-23 PROCEDURE — 80048 BASIC METABOLIC PNL TOTAL CA: CPT

## 2018-10-23 PROCEDURE — 85025 COMPLETE CBC W/AUTO DIFF WBC: CPT

## 2018-10-23 PROCEDURE — 99285 EMERGENCY DEPT VISIT HI MDM: CPT

## 2018-10-23 PROCEDURE — 6360000002 HC RX W HCPCS

## 2018-10-23 PROCEDURE — 96365 THER/PROPH/DIAG IV INF INIT: CPT

## 2018-10-23 PROCEDURE — 2500000003 HC RX 250 WO HCPCS: Performed by: EMERGENCY MEDICINE

## 2018-10-23 PROCEDURE — 96366 THER/PROPH/DIAG IV INF ADDON: CPT

## 2018-10-23 PROCEDURE — 36415 COLL VENOUS BLD VENIPUNCTURE: CPT

## 2018-10-23 PROCEDURE — 6360000002 HC RX W HCPCS: Performed by: EMERGENCY MEDICINE

## 2018-10-23 PROCEDURE — 6370000000 HC RX 637 (ALT 250 FOR IP): Performed by: EMERGENCY MEDICINE

## 2018-10-23 PROCEDURE — 96375 TX/PRO/DX INJ NEW DRUG ADDON: CPT

## 2018-10-23 PROCEDURE — 93010 ELECTROCARDIOGRAM REPORT: CPT | Performed by: INTERNAL MEDICINE

## 2018-10-23 PROCEDURE — 84484 ASSAY OF TROPONIN QUANT: CPT

## 2018-10-23 PROCEDURE — 93005 ELECTROCARDIOGRAM TRACING: CPT | Performed by: EMERGENCY MEDICINE

## 2018-10-23 RX ORDER — KETOROLAC TROMETHAMINE 30 MG/ML
30 INJECTION, SOLUTION INTRAMUSCULAR; INTRAVENOUS ONCE
Status: COMPLETED | OUTPATIENT
Start: 2018-10-23 | End: 2018-10-23

## 2018-10-23 RX ORDER — KETOROLAC TROMETHAMINE 30 MG/ML
INJECTION, SOLUTION INTRAMUSCULAR; INTRAVENOUS
Status: COMPLETED
Start: 2018-10-23 | End: 2018-10-23

## 2018-10-23 RX ORDER — ASPIRIN 81 MG/1
324 TABLET, CHEWABLE ORAL ONCE
Status: COMPLETED | OUTPATIENT
Start: 2018-10-23 | End: 2018-10-23

## 2018-10-23 RX ORDER — MORPHINE SULFATE 2 MG/ML
2 INJECTION, SOLUTION INTRAMUSCULAR; INTRAVENOUS ONCE
Status: COMPLETED | OUTPATIENT
Start: 2018-10-23 | End: 2018-10-23

## 2018-10-23 RX ORDER — NITROGLYCERIN 20 MG/100ML
5 INJECTION INTRAVENOUS CONTINUOUS
Status: DISCONTINUED | OUTPATIENT
Start: 2018-10-23 | End: 2018-10-23 | Stop reason: HOSPADM

## 2018-10-23 RX ADMIN — ASPIRIN 324 MG: 81 TABLET, CHEWABLE ORAL at 14:18

## 2018-10-23 RX ADMIN — KETOROLAC TROMETHAMINE 30 MG: 30 INJECTION, SOLUTION INTRAMUSCULAR; INTRAVENOUS at 16:16

## 2018-10-23 RX ADMIN — NITROGLYCERIN 5 MCG/MIN: 20 INJECTION INTRAVENOUS at 14:18

## 2018-10-23 RX ADMIN — MORPHINE SULFATE 2 MG: 2 INJECTION, SOLUTION INTRAMUSCULAR; INTRAVENOUS at 19:00

## 2018-10-23 RX ADMIN — NITROGLYCERIN 35 MCG/MIN: 20 INJECTION INTRAVENOUS at 17:20

## 2018-10-23 RX ADMIN — KETOROLAC TROMETHAMINE 30 MG: 30 INJECTION, SOLUTION INTRAMUSCULAR at 16:16

## 2018-10-23 ASSESSMENT — PAIN SCALES - GENERAL
PAINLEVEL_OUTOF10: 7
PAINLEVEL_OUTOF10: 9
PAINLEVEL_OUTOF10: 8
PAINLEVEL_OUTOF10: 7
PAINLEVEL_OUTOF10: 9
PAINLEVEL_OUTOF10: 7
PAINLEVEL_OUTOF10: 7

## 2018-10-23 ASSESSMENT — ENCOUNTER SYMPTOMS
EYE REDNESS: 0
EYE DISCHARGE: 0
NAUSEA: 0
SORE THROAT: 0
DIARRHEA: 0
ABDOMINAL PAIN: 0
BACK PAIN: 0
WHEEZING: 0
VOMITING: 0
COUGH: 0
RHINORRHEA: 0
SHORTNESS OF BREATH: 0

## 2018-10-23 ASSESSMENT — PAIN DESCRIPTION - PAIN TYPE
TYPE: CHRONIC PAIN

## 2018-10-23 ASSESSMENT — PAIN DESCRIPTION - LOCATION
LOCATION: CHEST

## 2018-10-23 ASSESSMENT — PAIN DESCRIPTION - DESCRIPTORS: DESCRIPTORS: HEAVINESS

## 2018-10-23 NOTE — ED NOTES
Patient resting in bed with call light in reach states no needs at this time. Respirations are easy and unlabored at this time call light within reach plan of care reviewed with patient voices understanding.  elimination offered        Jake Simpson RN  10/23/18 Dalia Purvis

## 2018-10-23 NOTE — ED NOTES
Patient resting in bed with call light in reach states no needs at this time. Respirations are easy and unlabored at this time call light within reach plan of care reviewed with patient voices understanding.  elimination offered        Desiree De La Torre RN  10/23/18 8963

## 2018-10-23 NOTE — ED NOTES
Patient on call light stating he wants his nitro turned up. States \"it doesn't ever do anything until its at like 115. \" This RN educated patient on nitro effects on BP and patients BP is 112/56 at 5mcg/min. Patients mother states, \"Well its never affected his BP before when he's had it. \" Explained again nitro effects on BP to patients and family and instructed patient that policy here was to monitor BP with every increase in Nitro. Patients mother then asks when patient will be transferred. Explained to patient and patients mother that [facility] has not returned phone calls yet and once that is established, [facility] will receive paperwork and I will then have an ETA for .  Patients mother rolls her eyes and then proceeds to ignore this ALVARADO Saenz RN  10/23/18 0799

## 2018-10-23 NOTE — ED PROVIDER NOTES
Pressure in his father; High Cholesterol in his father. SOCIAL HISTORY    reports that he quit smoking about 3 years ago. He smoked 1.00 pack per day. He has never used smokeless tobacco. He reports that he does not drink alcohol or use drugs. PHYSICAL EXAM     INITIAL VITALS:  oral temperature is 98.3 °F (36.8 °C). His blood pressure is 117/58 (abnormal) and his pulse is 74. His respiration is 18 and oxygen saturation is 97%. Physical Exam   Constitutional: He is oriented to person, place, and time. He appears well-developed and well-nourished. Non-toxic appearance. HENT:   Head: Normocephalic and atraumatic. Right Ear: Tympanic membrane and external ear normal.   Left Ear: Tympanic membrane and external ear normal.   Nose: Nose normal.   Mouth/Throat: Oropharynx is clear and moist and mucous membranes are normal. No oropharyngeal exudate, posterior oropharyngeal edema or posterior oropharyngeal erythema. Eyes: Conjunctivae and EOM are normal.   Neck: Normal range of motion. Neck supple. No JVD present. Cardiovascular: Normal rate, regular rhythm, normal heart sounds, intact distal pulses and normal pulses. Exam reveals no gallop and no friction rub. No murmur heard. Pulmonary/Chest: Effort normal and breath sounds normal. No respiratory distress. He has no decreased breath sounds. He has no wheezes. He has no rhonchi. He has no rales. Abdominal: Soft. Bowel sounds are normal. He exhibits no distension. There is no tenderness. There is no rebound, no guarding and no CVA tenderness. Musculoskeletal: Normal range of motion. He exhibits no edema. Neurological: He is alert and oriented to person, place, and time. He exhibits normal muscle tone. Coordination normal.   Skin: Skin is warm and dry. No rash noted. He is not diaphoretic. Nursing note and vitals reviewed.       DIFFERENTIAL DIAGNOSIS:   Including but not limited to: chronic chest pain    DIAGNOSTIC RESULTS     EKG: All EKG's are

## 2019-03-18 ENCOUNTER — APPOINTMENT (OUTPATIENT)
Dept: GENERAL RADIOLOGY | Age: 29
End: 2019-03-18
Payer: OTHER GOVERNMENT

## 2019-03-18 ENCOUNTER — HOSPITAL ENCOUNTER (EMERGENCY)
Age: 29
Discharge: ANOTHER HOSPITAL | End: 2019-03-18
Attending: EMERGENCY MEDICINE
Payer: OTHER GOVERNMENT

## 2019-03-18 VITALS
HEIGHT: 66 IN | WEIGHT: 180 LBS | SYSTOLIC BLOOD PRESSURE: 120 MMHG | TEMPERATURE: 98.6 F | OXYGEN SATURATION: 94 % | DIASTOLIC BLOOD PRESSURE: 79 MMHG | BODY MASS INDEX: 28.93 KG/M2 | RESPIRATION RATE: 18 BRPM | HEART RATE: 65 BPM

## 2019-03-18 DIAGNOSIS — R07.9 CHEST PAIN, UNSPECIFIED TYPE: Primary | ICD-10-CM

## 2019-03-18 DIAGNOSIS — Z86.79 HISTORY OF CORONARY ARTERY DISEASE: ICD-10-CM

## 2019-03-18 LAB
ALBUMIN SERPL-MCNC: 4.5 G/DL (ref 3.5–5.1)
ALP BLD-CCNC: 57 U/L (ref 38–126)
ALT SERPL-CCNC: 30 U/L (ref 11–66)
ANION GAP SERPL CALCULATED.3IONS-SCNC: 15 MEQ/L (ref 8–16)
AST SERPL-CCNC: 28 U/L (ref 5–40)
BASOPHILS # BLD: 0.3 %
BASOPHILS ABSOLUTE: 0 THOU/MM3 (ref 0–0.1)
BILIRUB SERPL-MCNC: 0.2 MG/DL (ref 0.3–1.2)
BUN BLDV-MCNC: 24 MG/DL (ref 7–22)
CALCIUM SERPL-MCNC: 9.1 MG/DL (ref 8.5–10.5)
CHLORIDE BLD-SCNC: 100 MEQ/L (ref 98–111)
CO2: 22 MEQ/L (ref 23–33)
CREAT SERPL-MCNC: 0.9 MG/DL (ref 0.4–1.2)
EOSINOPHIL # BLD: 2.8 %
EOSINOPHILS ABSOLUTE: 0.2 THOU/MM3 (ref 0–0.4)
ERYTHROCYTE [DISTWIDTH] IN BLOOD BY AUTOMATED COUNT: 11.8 % (ref 11.5–14.5)
ERYTHROCYTE [DISTWIDTH] IN BLOOD BY AUTOMATED COUNT: 40.4 FL (ref 35–45)
GFR SERPL CREATININE-BSD FRML MDRD: > 90 ML/MIN/1.73M2
GLUCOSE BLD-MCNC: 98 MG/DL (ref 70–108)
HCT VFR BLD CALC: 43 % (ref 42–52)
HEMOGLOBIN: 15.4 GM/DL (ref 14–18)
IMMATURE GRANS (ABS): 0.02 THOU/MM3 (ref 0–0.07)
IMMATURE GRANULOCYTES: 0.3 %
LYMPHOCYTES # BLD: 38.6 %
LYMPHOCYTES ABSOLUTE: 2.5 THOU/MM3 (ref 1–4.8)
MCH RBC QN AUTO: 34.1 PG (ref 26–33)
MCHC RBC AUTO-ENTMCNC: 35.8 GM/DL (ref 32.2–35.5)
MCV RBC AUTO: 95.3 FL (ref 80–94)
MONOCYTES # BLD: 6.1 %
MONOCYTES ABSOLUTE: 0.4 THOU/MM3 (ref 0.4–1.3)
NUCLEATED RED BLOOD CELLS: 0 /100 WBC
OSMOLALITY CALCULATION: 277.8 MOSMOL/KG (ref 275–300)
PLATELET # BLD: 254 THOU/MM3 (ref 130–400)
PMV BLD AUTO: 10.7 FL (ref 9.4–12.4)
POTASSIUM SERPL-SCNC: 4.2 MEQ/L (ref 3.5–5.2)
RBC # BLD: 4.51 MILL/MM3 (ref 4.7–6.1)
SEG NEUTROPHILS: 51.9 %
SEGMENTED NEUTROPHILS ABSOLUTE COUNT: 3.3 THOU/MM3 (ref 1.8–7.7)
SODIUM BLD-SCNC: 137 MEQ/L (ref 135–145)
TOTAL PROTEIN: 7.1 G/DL (ref 6.1–8)
TROPONIN T: < 0.01 NG/ML
WBC # BLD: 6.4 THOU/MM3 (ref 4.8–10.8)

## 2019-03-18 PROCEDURE — 80053 COMPREHEN METABOLIC PANEL: CPT

## 2019-03-18 PROCEDURE — 99285 EMERGENCY DEPT VISIT HI MDM: CPT

## 2019-03-18 PROCEDURE — 85025 COMPLETE CBC W/AUTO DIFF WBC: CPT

## 2019-03-18 PROCEDURE — 2500000003 HC RX 250 WO HCPCS: Performed by: PHYSICIAN ASSISTANT

## 2019-03-18 PROCEDURE — 6360000002 HC RX W HCPCS

## 2019-03-18 PROCEDURE — 96365 THER/PROPH/DIAG IV INF INIT: CPT

## 2019-03-18 PROCEDURE — 2709999900 HC NON-CHARGEABLE SUPPLY

## 2019-03-18 PROCEDURE — 6370000000 HC RX 637 (ALT 250 FOR IP): Performed by: PHYSICIAN ASSISTANT

## 2019-03-18 PROCEDURE — 36415 COLL VENOUS BLD VENIPUNCTURE: CPT

## 2019-03-18 PROCEDURE — 93005 ELECTROCARDIOGRAM TRACING: CPT | Performed by: PHYSICIAN ASSISTANT

## 2019-03-18 PROCEDURE — 71045 X-RAY EXAM CHEST 1 VIEW: CPT

## 2019-03-18 PROCEDURE — 96366 THER/PROPH/DIAG IV INF ADDON: CPT

## 2019-03-18 PROCEDURE — 84484 ASSAY OF TROPONIN QUANT: CPT

## 2019-03-18 RX ORDER — MORPHINE SULFATE 4 MG/ML
INJECTION, SOLUTION INTRAMUSCULAR; INTRAVENOUS
Status: COMPLETED
Start: 2019-03-18 | End: 2019-03-18

## 2019-03-18 RX ORDER — ASPIRIN 81 MG/1
324 TABLET, CHEWABLE ORAL ONCE
Status: COMPLETED | OUTPATIENT
Start: 2019-03-18 | End: 2019-03-18

## 2019-03-18 RX ORDER — NITROGLYCERIN 20 MG/100ML
5 INJECTION INTRAVENOUS CONTINUOUS
Status: DISCONTINUED | OUTPATIENT
Start: 2019-03-18 | End: 2019-03-19 | Stop reason: HOSPADM

## 2019-03-18 RX ORDER — CLOPIDOGREL BISULFATE 75 MG/1
75 TABLET ORAL DAILY
COMMUNITY

## 2019-03-18 RX ORDER — SPIRONOLACTONE 25 MG/1
25 TABLET ORAL DAILY
COMMUNITY

## 2019-03-18 RX ADMIN — NITROGLYCERIN 50 MCG/MIN: 20 INJECTION INTRAVENOUS at 21:35

## 2019-03-18 RX ADMIN — MORPHINE SULFATE 4 MG: 4 INJECTION INTRAVENOUS at 22:42

## 2019-03-18 RX ADMIN — NITROGLYCERIN 30 MCG/MIN: 20 INJECTION INTRAVENOUS at 21:18

## 2019-03-18 RX ADMIN — MORPHINE SULFATE 4 MG: 4 INJECTION INTRAVENOUS at 21:44

## 2019-03-18 RX ADMIN — ASPIRIN 81 MG 324 MG: 81 TABLET ORAL at 20:46

## 2019-03-18 RX ADMIN — NITROGLYCERIN 5 MCG/MIN: 20 INJECTION INTRAVENOUS at 20:43

## 2019-03-18 RX ADMIN — NITROGLYCERIN 20 MCG/MIN: 20 INJECTION INTRAVENOUS at 20:46

## 2019-03-18 ASSESSMENT — PAIN DESCRIPTION - LOCATION: LOCATION: CHEST

## 2019-03-18 ASSESSMENT — ENCOUNTER SYMPTOMS
RHINORRHEA: 0
DIARRHEA: 0
SORE THROAT: 0
WHEEZING: 0
EYE REDNESS: 0
EYE DISCHARGE: 0
SHORTNESS OF BREATH: 1
ABDOMINAL PAIN: 0
COUGH: 0
BACK PAIN: 0
VOMITING: 0
NAUSEA: 0

## 2019-03-18 ASSESSMENT — PAIN SCALES - GENERAL
PAINLEVEL_OUTOF10: 9

## 2019-03-18 ASSESSMENT — PAIN DESCRIPTION - PAIN TYPE: TYPE: ACUTE PAIN

## 2019-03-19 LAB
EKG ATRIAL RATE: 69 BPM
EKG ATRIAL RATE: 75 BPM
EKG P AXIS: 14 DEGREES
EKG P AXIS: 33 DEGREES
EKG P-R INTERVAL: 160 MS
EKG P-R INTERVAL: 184 MS
EKG Q-T INTERVAL: 388 MS
EKG Q-T INTERVAL: 400 MS
EKG QRS DURATION: 112 MS
EKG QRS DURATION: 116 MS
EKG QTC CALCULATION (BAZETT): 428 MS
EKG QTC CALCULATION (BAZETT): 433 MS
EKG R AXIS: -38 DEGREES
EKG R AXIS: -52 DEGREES
EKG T AXIS: 57 DEGREES
EKG T AXIS: 63 DEGREES
EKG VENTRICULAR RATE: 69 BPM
EKG VENTRICULAR RATE: 75 BPM

## 2019-03-19 PROCEDURE — 93010 ELECTROCARDIOGRAM REPORT: CPT | Performed by: INTERNAL MEDICINE

## 2019-03-28 ENCOUNTER — HOSPITAL ENCOUNTER (EMERGENCY)
Age: 29
Discharge: HOME | End: 2019-03-28
Payer: MEDICARE

## 2019-03-28 ENCOUNTER — OFFICE VISIT (OUTPATIENT)
Dept: ENT CLINIC | Age: 29
End: 2019-03-28
Payer: MEDICARE

## 2019-03-28 VITALS
DIASTOLIC BLOOD PRESSURE: 70 MMHG | HEART RATE: 60 BPM | WEIGHT: 187 LBS | BODY MASS INDEX: 30.05 KG/M2 | TEMPERATURE: 98.3 F | SYSTOLIC BLOOD PRESSURE: 124 MMHG | RESPIRATION RATE: 14 BRPM | HEIGHT: 66 IN

## 2019-03-28 VITALS
HEART RATE: 76 BPM | HEIGHT: 66 IN | WEIGHT: 180 LBS | TEMPERATURE: 96.9 F | OXYGEN SATURATION: 96 % | BODY MASS INDEX: 28.93 KG/M2 | SYSTOLIC BLOOD PRESSURE: 136 MMHG | RESPIRATION RATE: 16 BRPM | DIASTOLIC BLOOD PRESSURE: 85 MMHG

## 2019-03-28 DIAGNOSIS — K13.0 ABSCESS OF LIP: ICD-10-CM

## 2019-03-28 DIAGNOSIS — G89.18 POSTOPERATIVE PAIN: Primary | ICD-10-CM

## 2019-03-28 DIAGNOSIS — K13.0 ABSCESS OF LIP: Primary | ICD-10-CM

## 2019-03-28 PROCEDURE — G8484 FLU IMMUNIZE NO ADMIN: HCPCS | Performed by: OTOLARYNGOLOGY

## 2019-03-28 PROCEDURE — 99203 OFFICE O/P NEW LOW 30 MIN: CPT | Performed by: OTOLARYNGOLOGY

## 2019-03-28 PROCEDURE — G8598 ASA/ANTIPLAT THER USED: HCPCS | Performed by: OTOLARYNGOLOGY

## 2019-03-28 PROCEDURE — 1036F TOBACCO NON-USER: CPT | Performed by: OTOLARYNGOLOGY

## 2019-03-28 PROCEDURE — 99203 OFFICE O/P NEW LOW 30 MIN: CPT | Performed by: NURSE PRACTITIONER

## 2019-03-28 PROCEDURE — 99214 OFFICE O/P EST MOD 30 MIN: CPT

## 2019-03-28 PROCEDURE — G8427 DOCREV CUR MEDS BY ELIG CLIN: HCPCS | Performed by: OTOLARYNGOLOGY

## 2019-03-28 PROCEDURE — G8417 CALC BMI ABV UP PARAM F/U: HCPCS | Performed by: OTOLARYNGOLOGY

## 2019-03-28 RX ORDER — CLINDAMYCIN HYDROCHLORIDE 300 MG/1
300 CAPSULE ORAL 3 TIMES DAILY
Qty: 21 CAPSULE | Refills: 0 | Status: SHIPPED | OUTPATIENT
Start: 2019-03-28 | End: 2019-04-04

## 2019-03-28 RX ORDER — PREDNISONE 20 MG/1
40 TABLET ORAL DAILY
Qty: 12 TABLET | Refills: 0 | Status: SHIPPED | OUTPATIENT
Start: 2019-03-28 | End: 2019-04-03

## 2019-03-28 RX ORDER — TRAMADOL HYDROCHLORIDE 50 MG/1
50 TABLET ORAL EVERY 6 HOURS PRN
Qty: 4 TABLET | Refills: 0 | Status: SHIPPED | OUTPATIENT
Start: 2019-03-28 | End: 2019-04-01

## 2019-03-28 RX ORDER — IBUPROFEN 200 MG
200 TABLET ORAL EVERY 6 HOURS PRN
COMMUNITY

## 2019-03-28 ASSESSMENT — ENCOUNTER SYMPTOMS
VOMITING: 0
NAUSEA: 0
SHORTNESS OF BREATH: 0

## 2019-03-28 ASSESSMENT — PAIN SCALES - GENERAL: PAINLEVEL_OUTOF10: 2

## 2019-03-28 ASSESSMENT — PAIN DESCRIPTION - LOCATION: LOCATION: MOUTH

## 2019-03-28 ASSESSMENT — PAIN DESCRIPTION - DESCRIPTORS: DESCRIPTORS: ACHING

## 2019-03-28 ASSESSMENT — PAIN - FUNCTIONAL ASSESSMENT: PAIN_FUNCTIONAL_ASSESSMENT: ACTIVITIES ARE NOT PREVENTED

## 2019-03-28 ASSESSMENT — PAIN DESCRIPTION - ONSET: ONSET: GRADUAL

## 2019-03-28 ASSESSMENT — PAIN DESCRIPTION - PROGRESSION: CLINICAL_PROGRESSION: GRADUALLY WORSENING

## 2019-03-28 ASSESSMENT — PAIN DESCRIPTION - FREQUENCY: FREQUENCY: CONTINUOUS

## 2019-03-28 ASSESSMENT — PAIN DESCRIPTION - PAIN TYPE: TYPE: ACUTE PAIN

## 2019-03-29 LAB
AEROBIC CULTURE: ABNORMAL
GRAM STAIN RESULT: ABNORMAL
ORGANISM: ABNORMAL

## 2019-06-26 ENCOUNTER — APPOINTMENT (OUTPATIENT)
Dept: GENERAL RADIOLOGY | Age: 29
End: 2019-06-26
Payer: OTHER GOVERNMENT

## 2019-06-26 ENCOUNTER — HOSPITAL ENCOUNTER (EMERGENCY)
Age: 29
Discharge: ANOTHER HOSPITAL | End: 2019-06-27
Attending: EMERGENCY MEDICINE
Payer: OTHER GOVERNMENT

## 2019-06-26 VITALS
SYSTOLIC BLOOD PRESSURE: 129 MMHG | HEIGHT: 66 IN | RESPIRATION RATE: 14 BRPM | WEIGHT: 180 LBS | DIASTOLIC BLOOD PRESSURE: 74 MMHG | TEMPERATURE: 98.1 F | BODY MASS INDEX: 28.93 KG/M2 | HEART RATE: 67 BPM | OXYGEN SATURATION: 95 %

## 2019-06-26 DIAGNOSIS — R07.9 CHEST PAIN WITH HIGH RISK FOR CARDIAC ETIOLOGY: Primary | ICD-10-CM

## 2019-06-26 LAB
ALBUMIN SERPL-MCNC: 4.2 G/DL (ref 3.5–5.1)
ALP BLD-CCNC: 73 U/L (ref 38–126)
ALT SERPL-CCNC: 43 U/L (ref 11–66)
ANION GAP SERPL CALCULATED.3IONS-SCNC: 13 MEQ/L (ref 8–16)
AST SERPL-CCNC: 26 U/L (ref 5–40)
BASOPHILS # BLD: 0.2 %
BASOPHILS ABSOLUTE: 0 THOU/MM3 (ref 0–0.1)
BILIRUB SERPL-MCNC: 0.2 MG/DL (ref 0.3–1.2)
BUN BLDV-MCNC: 21 MG/DL (ref 7–22)
CALCIUM SERPL-MCNC: 9.3 MG/DL (ref 8.5–10.5)
CHLORIDE BLD-SCNC: 103 MEQ/L (ref 98–111)
CO2: 22 MEQ/L (ref 23–33)
CREAT SERPL-MCNC: 1.1 MG/DL (ref 0.4–1.2)
EKG ATRIAL RATE: 75 BPM
EKG P AXIS: 49 DEGREES
EKG P-R INTERVAL: 176 MS
EKG Q-T INTERVAL: 400 MS
EKG QRS DURATION: 118 MS
EKG QTC CALCULATION (BAZETT): 446 MS
EKG R AXIS: -43 DEGREES
EKG T AXIS: 50 DEGREES
EKG VENTRICULAR RATE: 75 BPM
EOSINOPHIL # BLD: 1.8 %
EOSINOPHILS ABSOLUTE: 0.1 THOU/MM3 (ref 0–0.4)
ERYTHROCYTE [DISTWIDTH] IN BLOOD BY AUTOMATED COUNT: 12.2 % (ref 11.5–14.5)
ERYTHROCYTE [DISTWIDTH] IN BLOOD BY AUTOMATED COUNT: 42.9 FL (ref 35–45)
GFR SERPL CREATININE-BSD FRML MDRD: 79 ML/MIN/1.73M2
GLUCOSE BLD-MCNC: 137 MG/DL (ref 70–108)
HCT VFR BLD CALC: 44.8 % (ref 42–52)
HEMOGLOBIN: 15.6 GM/DL (ref 14–18)
IMMATURE GRANS (ABS): 0.01 THOU/MM3 (ref 0–0.07)
IMMATURE GRANULOCYTES: 0.2 %
LIPASE: 38.5 U/L (ref 5.6–51.3)
LYMPHOCYTES # BLD: 36.1 %
LYMPHOCYTES ABSOLUTE: 2.2 THOU/MM3 (ref 1–4.8)
MCH RBC QN AUTO: 33.5 PG (ref 26–33)
MCHC RBC AUTO-ENTMCNC: 34.8 GM/DL (ref 32.2–35.5)
MCV RBC AUTO: 96.3 FL (ref 80–94)
MONOCYTES # BLD: 7.8 %
MONOCYTES ABSOLUTE: 0.5 THOU/MM3 (ref 0.4–1.3)
NUCLEATED RED BLOOD CELLS: 0 /100 WBC
OSMOLALITY CALCULATION: 280.8 MOSMOL/KG (ref 275–300)
PLATELET # BLD: 258 THOU/MM3 (ref 130–400)
PMV BLD AUTO: 10.4 FL (ref 9.4–12.4)
POTASSIUM REFLEX MAGNESIUM: 3.8 MEQ/L (ref 3.5–5.2)
POTASSIUM SERPL-SCNC: 3.8 MEQ/L (ref 3.5–5.2)
PRO-BNP: 42.5 PG/ML (ref 0–450)
RBC # BLD: 4.65 MILL/MM3 (ref 4.7–6.1)
SEG NEUTROPHILS: 53.9 %
SEGMENTED NEUTROPHILS ABSOLUTE COUNT: 3.3 THOU/MM3 (ref 1.8–7.7)
SODIUM BLD-SCNC: 138 MEQ/L (ref 135–145)
TOTAL PROTEIN: 6.7 G/DL (ref 6.1–8)
TROPONIN T: < 0.01 NG/ML
WBC # BLD: 6.1 THOU/MM3 (ref 4.8–10.8)

## 2019-06-26 PROCEDURE — 6360000002 HC RX W HCPCS: Performed by: EMERGENCY MEDICINE

## 2019-06-26 PROCEDURE — 80053 COMPREHEN METABOLIC PANEL: CPT

## 2019-06-26 PROCEDURE — 36415 COLL VENOUS BLD VENIPUNCTURE: CPT

## 2019-06-26 PROCEDURE — 84484 ASSAY OF TROPONIN QUANT: CPT

## 2019-06-26 PROCEDURE — 96374 THER/PROPH/DIAG INJ IV PUSH: CPT

## 2019-06-26 PROCEDURE — 6370000000 HC RX 637 (ALT 250 FOR IP): Performed by: EMERGENCY MEDICINE

## 2019-06-26 PROCEDURE — 2500000003 HC RX 250 WO HCPCS: Performed by: EMERGENCY MEDICINE

## 2019-06-26 PROCEDURE — 85025 COMPLETE CBC W/AUTO DIFF WBC: CPT

## 2019-06-26 PROCEDURE — 96376 TX/PRO/DX INJ SAME DRUG ADON: CPT

## 2019-06-26 PROCEDURE — 80048 BASIC METABOLIC PNL TOTAL CA: CPT

## 2019-06-26 PROCEDURE — 83690 ASSAY OF LIPASE: CPT

## 2019-06-26 PROCEDURE — 93010 ELECTROCARDIOGRAM REPORT: CPT | Performed by: INTERNAL MEDICINE

## 2019-06-26 PROCEDURE — 83880 ASSAY OF NATRIURETIC PEPTIDE: CPT

## 2019-06-26 PROCEDURE — 71045 X-RAY EXAM CHEST 1 VIEW: CPT

## 2019-06-26 PROCEDURE — 93005 ELECTROCARDIOGRAM TRACING: CPT | Performed by: EMERGENCY MEDICINE

## 2019-06-26 PROCEDURE — 2709999900 HC NON-CHARGEABLE SUPPLY

## 2019-06-26 PROCEDURE — 99285 EMERGENCY DEPT VISIT HI MDM: CPT

## 2019-06-26 RX ORDER — MORPHINE SULFATE 2 MG/ML
2 INJECTION, SOLUTION INTRAMUSCULAR; INTRAVENOUS ONCE
Status: COMPLETED | OUTPATIENT
Start: 2019-06-26 | End: 2019-06-26

## 2019-06-26 RX ORDER — ASPIRIN 81 MG/1
324 TABLET, CHEWABLE ORAL ONCE
Status: COMPLETED | OUTPATIENT
Start: 2019-06-26 | End: 2019-06-26

## 2019-06-26 RX ORDER — NITROGLYCERIN 20 MG/100ML
5 INJECTION INTRAVENOUS CONTINUOUS
Status: DISCONTINUED | OUTPATIENT
Start: 2019-06-26 | End: 2019-06-27 | Stop reason: HOSPADM

## 2019-06-26 RX ADMIN — ASPIRIN 81 MG 243 MG: 81 TABLET ORAL at 21:07

## 2019-06-26 RX ADMIN — MORPHINE SULFATE 2 MG: 2 INJECTION, SOLUTION INTRAMUSCULAR; INTRAVENOUS at 21:57

## 2019-06-26 RX ADMIN — NITROGLYCERIN 15 MCG/MIN: 20 INJECTION INTRAVENOUS at 21:15

## 2019-06-26 RX ADMIN — MORPHINE SULFATE 2 MG: 2 INJECTION, SOLUTION INTRAMUSCULAR; INTRAVENOUS at 23:07

## 2019-06-26 ASSESSMENT — PAIN SCALES - GENERAL
PAINLEVEL_OUTOF10: 9

## 2019-06-26 ASSESSMENT — ENCOUNTER SYMPTOMS
DIARRHEA: 0
SORE THROAT: 0
WHEEZING: 0
RHINORRHEA: 0
VOMITING: 0
ABDOMINAL PAIN: 0
BACK PAIN: 0
COUGH: 0
NAUSEA: 0
EYE REDNESS: 0
SHORTNESS OF BREATH: 0
EYE DISCHARGE: 0

## 2019-06-26 ASSESSMENT — PAIN DESCRIPTION - ORIENTATION: ORIENTATION: LEFT

## 2019-06-26 ASSESSMENT — PAIN DESCRIPTION - LOCATION
LOCATION: CHEST
LOCATION: CHEST

## 2019-06-26 ASSESSMENT — PAIN DESCRIPTION - DESCRIPTORS
DESCRIPTORS: STABBING
DESCRIPTORS: STABBING

## 2019-06-27 PROCEDURE — 6360000002 HC RX W HCPCS: Performed by: EMERGENCY MEDICINE

## 2019-06-27 PROCEDURE — 2709999900 HC NON-CHARGEABLE SUPPLY

## 2019-06-27 PROCEDURE — 96375 TX/PRO/DX INJ NEW DRUG ADDON: CPT

## 2019-06-27 RX ORDER — HEPARIN SODIUM 10000 [USP'U]/100ML
12 INJECTION, SOLUTION INTRAVENOUS CONTINUOUS
Status: DISCONTINUED | OUTPATIENT
Start: 2019-06-27 | End: 2019-06-27 | Stop reason: HOSPADM

## 2019-06-27 RX ORDER — HEPARIN SODIUM 1000 [USP'U]/ML
2000 INJECTION, SOLUTION INTRAVENOUS; SUBCUTANEOUS PRN
Status: DISCONTINUED | OUTPATIENT
Start: 2019-06-27 | End: 2019-06-27 | Stop reason: HOSPADM

## 2019-06-27 RX ORDER — HEPARIN SODIUM 1000 [USP'U]/ML
4000 INJECTION, SOLUTION INTRAVENOUS; SUBCUTANEOUS PRN
Status: DISCONTINUED | OUTPATIENT
Start: 2019-06-27 | End: 2019-06-27 | Stop reason: HOSPADM

## 2019-06-27 RX ORDER — HEPARIN SODIUM 1000 [USP'U]/ML
4000 INJECTION, SOLUTION INTRAVENOUS; SUBCUTANEOUS ONCE
Status: COMPLETED | OUTPATIENT
Start: 2019-06-27 | End: 2019-06-27

## 2019-06-27 RX ORDER — MORPHINE SULFATE 2 MG/ML
2 INJECTION, SOLUTION INTRAMUSCULAR; INTRAVENOUS
Status: DISCONTINUED | OUTPATIENT
Start: 2019-06-27 | End: 2019-06-27 | Stop reason: HOSPADM

## 2019-06-27 RX ADMIN — HEPARIN SODIUM 4000 UNITS: 1000 INJECTION INTRAVENOUS; SUBCUTANEOUS at 00:09

## 2019-06-27 RX ADMIN — HEPARIN SODIUM 12 UNITS/KG/HR: 10000 INJECTION, SOLUTION INTRAVENOUS at 00:09

## 2019-06-27 RX ADMIN — MORPHINE SULFATE 2 MG: 2 INJECTION, SOLUTION INTRAMUSCULAR; INTRAVENOUS at 00:15

## 2019-06-27 ASSESSMENT — PAIN SCALES - GENERAL: PAINLEVEL_OUTOF10: 9

## 2019-06-27 NOTE — ED PROVIDER NOTES
Negative for decreased urine volume, difficulty urinating, dysuria and hematuria. Musculoskeletal: Negative for arthralgias, back pain, joint swelling and neck pain. Skin: Negative for pallor and rash. Allergic/Immunologic: Negative for environmental allergies. Neurological: Negative for dizziness, syncope, weakness, light-headedness, numbness and headaches. Hematological: Negative for adenopathy. Psychiatric/Behavioral: Negative for confusion and suicidal ideas. The patient is not nervous/anxious. PAST MEDICAL HISTORY    has a past medical history of CAD (coronary artery disease), CHF (congestive heart failure) ([facility] [address] Hyperlipidemia, Hypertension, and Psychiatric problem. SURGICAL HISTORY      has a past surgical history that includes Cardiac surgery (07-); Coronary angioplasty with stent (09/19/2014); Elbow surgery (Left); Arm Surgery (Left); and fracture surgery. CURRENT MEDICATIONS       Discharge Medication List as of 6/27/2019 12:31 AM      CONTINUE these medications which have NOT CHANGED    Details   ibuprofen (ADVIL;MOTRIN) 200 MG tablet Take 200 mg by mouth every 6 hours as needed for PainHistorical Med      spironolactone (ALDACTONE) 25 MG tablet Take 12.5 mg by mouth dailyHistorical Med      clopidogrel (PLAVIX) 75 MG tablet Take 75 mg by mouth dailyHistorical Med      allopurinol (ZYLOPRIM) 100 MG tablet Take 100 mg by mouth daily Unsure of doseHistorical Med      metoprolol tartrate (LOPRESSOR) 50 MG tablet Take 50 mg by mouth onceHistorical Med      ranitidine (ZANTAC) 150 MG capsule Take 150 mg by mouth onceHistorical Med      furosemide (LASIX) 20 MG tablet Take 20 mg by mouth as neededHistorical Med      nitroGLYCERIN (NITROSTAT) 0.4 MG SL tablet Place 1 tablet under the tongue every 5 minutes as needed for Chest pain., Disp-25 tablet, R-3      ranolazine (RANEXA) 500 MG SR tablet Take 1 tablet by mouth 2 times daily. , Disp-60 tablet, R-3      lisinopril (PRINIVIL;ZESTRIL) 2.5 MG tablet Take 1 tablet by mouth daily. , Disp-30 tablet, R-3      isosorbide mononitrate (IMDUR) 30 MG CR tablet Take 120 mg by mouth daily Historical Med      aspirin 81 MG tablet Take 81 mg by mouth daily. atorvastatin (LIPITOR) 10 MG tablet Take 80 mg by mouth daily. ALLERGIES     is allergic to pcn [penicillins]. FAMILY HISTORY     indicated that his mother is alive. He indicated that his father is alive. He indicated that his brother is alive. family history includes Arthritis in his father; Heart Disease in his father; High Blood Pressure in his father; High Cholesterol in his father. SOCIAL HISTORY      reports that he quit smoking about 4 years ago. He smoked 1.00 pack per day. He has never used smokeless tobacco. He reports that he does not drink alcohol or use drugs. PHYSICAL EXAM     INITIAL VITALS:  height is 5' 6\" (1.676 m) and weight is 180 lb (81.6 kg). His oral temperature is 98.1 °F (36.7 °C). His blood pressure is 129/74 and his pulse is 67. His respiration is 14 and oxygen saturation is 95%. Physical Exam   Constitutional: He is oriented to person, place, and time. He appears well-developed and well-nourished. Non-toxic appearance. HENT:   Head: Normocephalic and atraumatic. Right Ear: Tympanic membrane and external ear normal.   Left Ear: Tympanic membrane and external ear normal.   Nose: Nose normal.   Mouth/Throat: Oropharynx is clear and moist and mucous membranes are normal. No oropharyngeal exudate, posterior oropharyngeal edema or posterior oropharyngeal erythema. Eyes: Conjunctivae and EOM are normal.   Neck: Normal range of motion. Neck supple. No JVD present. Cardiovascular: Normal rate, regular rhythm, normal heart sounds, intact distal pulses and normal pulses. Exam reveals no gallop and no friction rub. No murmur heard. Pulmonary/Chest: Effort normal and breath sounds normal. No respiratory distress.  He has no decreased breath sounds. He has no wheezes. He has no rhonchi. He has no rales. Abdominal: Soft. Bowel sounds are normal. He exhibits no distension. There is no tenderness. There is no rebound, no guarding and no CVA tenderness. Musculoskeletal: Normal range of motion. He exhibits no edema. Neurological: He is alert and oriented to person, place, and time. He exhibits normal muscle tone. Coordination normal.   Skin: Skin is warm and dry. No rash noted. He is not diaphoretic. Nursing note and vitals reviewed. DIFFERENTIAL DIAGNOSIS:   ACS, anxiety, pneumonia, MI    DIAGNOSTIC RESULTS     EKG: All EKG's are interpreted by the Emergency Department Physician who either signs or Co-signsthis chart in the absence of a cardiologist.    Neol Castaneda. Rate: 75 bpm  OK interval: 176 ms  QRS duration: 118 ms  QTc: 446 ms  P-R-T axes: 49, -43, 50  Normal sinus rhythm   Left axis deviation   Inferior infarct, age undetermined   No STEMI. Compared to old EKG on 18-March-2019    RADIOLOGY: non-plain film images(s) such as CT, Ultrasound and MRI are read by the radiologist.    XR CHEST PORTABLE   Final Result      No acute cardiopulmonary disease. Status post CABG surgery and coronary artery stenting. **This report has been created using voice recognition software. It may contain minor errors which are inherent in voice recognition technology. **      Final report electronically signed by Dr. Susannah Ware on 6/26/2019 9:25 PM          [x]Visualized and interpreted by me   [x] Radiologist's Wet Read Report Reviewed   [] Discussed with Radiologist.    Zuly Schuster:   Results for orders placed or performed during the hospital encounter of 06/26/19   CBC Auto Differential   Result Value Ref Range    WBC 6.1 4.8 - 10.8 thou/mm3    RBC 4.65 (L) 4.70 - 6.10 mill/mm3    Hemoglobin 15.6 14.0 - 18.0 gm/dl    Hematocrit 44.8 42.0 - 52.0 %    MCV 96.3 (H) 80.0 - 94.0 fL    MCH 33.5 (H) 26.0 - 33.0 pg    MCHC 34.8 32.2 - 35.5 gm/dl    RDW-CV 12.2 11.5 - 14.5 %    RDW-SD 42.9 35.0 - 45.0 fL    Platelets 880 688 - 933 thou/mm3    MPV 10.4 9.4 - 12.4 fL    Seg Neutrophils 53.9 %    Lymphocytes 36.1 %    Monocytes 7.8 %    Eosinophils 1.8 %    Basophils 0.2 %    Immature Granulocytes 0.2 %    Segs Absolute 3.3 1.8 - 7.7 thou/mm3    Lymphocytes # 2.2 1.0 - 4.8 thou/mm3    Monocytes # 0.5 0.4 - 1.3 thou/mm3    Eosinophils # 0.1 0.0 - 0.4 thou/mm3    Basophils # 0.0 0.0 - 0.1 thou/mm3    Immature Grans (Abs) 0.01 0.00 - 0.07 thou/mm3    nRBC 0 /100 wbc   Brain Natriuretic Peptide   Result Value Ref Range    Pro-BNP 42.5 0.0 - 450.0 pg/mL   Troponin   Result Value Ref Range    Troponin T < 0.010 ng/ml   Basic Metabolic Panel w/ Reflex to MG   Result Value Ref Range    Sodium 138 135 - 145 meq/L    Potassium reflex Magnesium 3.8 3.5 - 5.2 meq/L    Chloride 103 98 - 111 meq/L    CO2 22 (L) 23 - 33 meq/L    Glucose 137 (H) 70 - 108 mg/dL    BUN 21 7 - 22 mg/dL    CREATININE 1.1 0.4 - 1.2 mg/dL    Calcium 9.3 8.5 - 10.5 mg/dL   Comprehensive Metabolic Panel   Result Value Ref Range    Potassium 3.8 3.5 - 5.2 meq/L    AST 26 5 - 40 U/L    Alkaline Phosphatase 73 38 - 126 U/L    Total Protein 6.7 6.1 - 8.0 g/dL    Alb 4.2 3.5 - 5.1 g/dL    Total Bilirubin 0.2 (L) 0.3 - 1.2 mg/dL    ALT 43 11 - 66 U/L   Lipase   Result Value Ref Range    Lipase 38.5 5.6 - 51.3 U/L   Anion Gap   Result Value Ref Range    Anion Gap 13.0 8.0 - 16.0 meq/L   Glomerular Filtration Rate, Estimated   Result Value Ref Range    Est, Glom Filt Rate 79 (A) ml/min/1.73m2   Osmolality   Result Value Ref Range    Osmolality Calc 280.8 275.0 - 300 mOsmol/kg   EKG 12 Lead   Result Value Ref Range    Ventricular Rate 75 BPM    Atrial Rate 75 BPM    P-R Interval 176 ms    QRS Duration 118 ms    Q-T Interval 400 ms    QTc Calculation (Bazett) 446 ms    P Axis 49 degrees    R Axis -43 degrees    T Axis 50 degrees       EMERGENCY DEPARTMENT COURSE:   Vitals:    Vitals:    06/26/19 2039 06/26/19 2042 06/26/19 2146 06/26/19 2253   BP:   134/77 129/74   Pulse:   81 67   Resp:   15 14   Temp: 98.1 °F (36.7 °C)      TempSrc: Oral      SpO2:  96% 97% 95%   Weight:  180 lb (81.6 kg)     Height:  5' 6\" (1.676 m)         20:55    Patient is seen and evaluated in a timely fashion. MedicalDecision Making    Cardiac workup was negative. Chest pain moderately improved with Nitro . Discussed with [facility] who said there was no bed for direct admit. Patient is transferred to the ED and accepted under Dr. Anna Marie Freeman. Heparin drip and Nitro drip are started in ED. ASA given in ED. CRITICAL CARE:   None    CONSULTS:  [facility]     PROCEDURES:  None    FINAL IMPRESSION      1. Chest pain with high risk for cardiac etiology          DISPOSITION/PLAN   Transfer     PATIENT REFERRED TO:  No follow-up provider specified.     DISCHARGE MEDICATIONS:  Discharge Medication List as of 6/27/2019 12:31 AM          (Please note that portions of this note were completed with a voice recognition program.  Efforts were made to edit the dictations but occasionally words aremis-transcribed.)    MD Ulices Carrasquillo MD  06/27/19 9561

## 2019-06-27 NOTE — ED NOTES
Patient to ED by private car for chest pain since yesterday. Patient has congenital heart problems for which he follows at [facility]. Patient states that pain is usually only resolved with nitro drip. Patient took SL nitro yesterday without relief.       Leif Macdonald, [address] [place] [address]  06/26/19 2055

## 2019-07-30 ENCOUNTER — TELEPHONE (OUTPATIENT)
Dept: INTERNAL MEDICINE CLINIC | Age: 29
End: 2019-07-30

## 2019-07-30 NOTE — TELEPHONE ENCOUNTER
Called patient to remind him of his appointment on 7/31/19 and he stated that he has already seen a Dietician at the [place]. He does not wish to be seen here.  Please discharge

## 2019-12-21 ENCOUNTER — HOSPITAL ENCOUNTER (EMERGENCY)
Age: 29
Discharge: HOME | End: 2019-12-21
Payer: MEDICARE

## 2019-12-21 VITALS
TEMPERATURE: 98.9 F | HEART RATE: 78 BPM | OXYGEN SATURATION: 98 % | RESPIRATION RATE: 18 BRPM | SYSTOLIC BLOOD PRESSURE: 125 MMHG | WEIGHT: 166 LBS | HEIGHT: 66 IN | DIASTOLIC BLOOD PRESSURE: 62 MMHG | BODY MASS INDEX: 26.68 KG/M2

## 2019-12-21 DIAGNOSIS — K11.20 PAROTIDITIS: Primary | ICD-10-CM

## 2019-12-21 DIAGNOSIS — K11.5 SALIVARY DUCT STONE: ICD-10-CM

## 2019-12-21 PROCEDURE — 99212 OFFICE O/P EST SF 10 MIN: CPT

## 2019-12-21 RX ORDER — METOPROLOL SUCCINATE 50 MG/1
50 TABLET, EXTENDED RELEASE ORAL DAILY
Status: ON HOLD | COMMUNITY
End: 2021-10-10

## 2019-12-21 RX ORDER — LORAZEPAM 1 MG/1
1 TABLET ORAL EVERY 6 HOURS PRN
Status: ON HOLD | COMMUNITY
End: 2021-10-10

## 2019-12-21 RX ORDER — RAMELTEON 8 MG/1
8 TABLET ORAL NIGHTLY
COMMUNITY

## 2019-12-21 RX ORDER — CLINDAMYCIN HYDROCHLORIDE 300 MG/1
300 CAPSULE ORAL 3 TIMES DAILY
Qty: 30 CAPSULE | Refills: 0 | Status: SHIPPED | OUTPATIENT
Start: 2019-12-21 | End: 2019-12-31

## 2019-12-21 RX ORDER — FUROSEMIDE 20 MG/1
20 TABLET ORAL 2 TIMES DAILY
COMMUNITY
End: 2020-06-04 | Stop reason: ALTCHOICE

## 2019-12-21 RX ORDER — AMLODIPINE BESYLATE 10 MG/1
10 TABLET ORAL DAILY
COMMUNITY

## 2019-12-21 ASSESSMENT — PAIN DESCRIPTION - LOCATION: LOCATION: NECK

## 2019-12-21 ASSESSMENT — PAIN DESCRIPTION - PAIN TYPE: TYPE: ACUTE PAIN

## 2019-12-21 ASSESSMENT — PAIN DESCRIPTION - FREQUENCY: FREQUENCY: INTERMITTENT

## 2019-12-21 ASSESSMENT — ENCOUNTER SYMPTOMS
VOICE CHANGE: 0
COUGH: 0
FACIAL SWELLING: 1
TROUBLE SWALLOWING: 0
VOMITING: 0
SORE THROAT: 0
NAUSEA: 0
SHORTNESS OF BREATH: 0

## 2019-12-21 ASSESSMENT — PAIN DESCRIPTION - PROGRESSION: CLINICAL_PROGRESSION: NOT CHANGED

## 2019-12-21 ASSESSMENT — PAIN SCALES - GENERAL: PAINLEVEL_OUTOF10: 1

## 2019-12-21 ASSESSMENT — PAIN DESCRIPTION - DESCRIPTORS: DESCRIPTORS: DISCOMFORT

## 2019-12-21 ASSESSMENT — PAIN - FUNCTIONAL ASSESSMENT: PAIN_FUNCTIONAL_ASSESSMENT: ACTIVITIES ARE NOT PREVENTED

## 2019-12-21 ASSESSMENT — PAIN DESCRIPTION - ORIENTATION: ORIENTATION: LEFT

## 2019-12-21 ASSESSMENT — PAIN DESCRIPTION - ONSET: ONSET: ON-GOING

## 2020-01-06 ENCOUNTER — HOSPITAL ENCOUNTER (EMERGENCY)
Age: 30
Discharge: ANOTHER HOSPITAL | End: 2020-01-06
Attending: EMERGENCY MEDICINE
Payer: MEDICARE

## 2020-01-06 ENCOUNTER — APPOINTMENT (OUTPATIENT)
Dept: GENERAL RADIOLOGY | Age: 30
End: 2020-01-06
Payer: MEDICARE

## 2020-01-06 VITALS
HEART RATE: 64 BPM | HEIGHT: 66 IN | DIASTOLIC BLOOD PRESSURE: 80 MMHG | OXYGEN SATURATION: 98 % | RESPIRATION RATE: 12 BRPM | WEIGHT: 164 LBS | TEMPERATURE: 98.4 F | BODY MASS INDEX: 26.36 KG/M2 | SYSTOLIC BLOOD PRESSURE: 142 MMHG

## 2020-01-06 LAB
ALBUMIN SERPL-MCNC: 4.3 G/DL (ref 3.5–5.1)
ALP BLD-CCNC: 55 U/L (ref 38–126)
ALT SERPL-CCNC: 34 U/L (ref 11–66)
AMPHETAMINE+METHAMPHETAMINE URINE SCREEN: NEGATIVE
ANION GAP SERPL CALCULATED.3IONS-SCNC: 18 MEQ/L (ref 8–16)
AST SERPL-CCNC: 23 U/L (ref 5–40)
BARBITURATE QUANTITATIVE URINE: NEGATIVE
BASOPHILS # BLD: 0.2 %
BASOPHILS ABSOLUTE: 0 THOU/MM3 (ref 0–0.1)
BENZODIAZEPINE QUANTITATIVE URINE: NEGATIVE
BILIRUB SERPL-MCNC: 0.5 MG/DL (ref 0.3–1.2)
BILIRUBIN DIRECT: < 0.2 MG/DL (ref 0–0.3)
BUN BLDV-MCNC: 14 MG/DL (ref 7–22)
CALCIUM SERPL-MCNC: 9.6 MG/DL (ref 8.5–10.5)
CANNABINOID QUANTITATIVE URINE: POSITIVE
CHLORIDE BLD-SCNC: 101 MEQ/L (ref 98–111)
CO2: 18 MEQ/L (ref 23–33)
COCAINE METABOLITE QUANTITATIVE URINE: NEGATIVE
CREAT SERPL-MCNC: 1.1 MG/DL (ref 0.4–1.2)
D-DIMER QUANTITATIVE: < 215 NG/ML FEU (ref 0–500)
EKG ATRIAL RATE: 85 BPM
EKG P AXIS: 54 DEGREES
EKG P-R INTERVAL: 162 MS
EKG Q-T INTERVAL: 384 MS
EKG QRS DURATION: 114 MS
EKG QTC CALCULATION (BAZETT): 456 MS
EKG R AXIS: -32 DEGREES
EKG T AXIS: 70 DEGREES
EKG VENTRICULAR RATE: 85 BPM
EOSINOPHIL # BLD: 0.8 %
EOSINOPHILS ABSOLUTE: 0.1 THOU/MM3 (ref 0–0.4)
ERYTHROCYTE [DISTWIDTH] IN BLOOD BY AUTOMATED COUNT: 12.1 % (ref 11.5–14.5)
ERYTHROCYTE [DISTWIDTH] IN BLOOD BY AUTOMATED COUNT: 44.2 FL (ref 35–45)
GFR SERPL CREATININE-BSD FRML MDRD: 79 ML/MIN/1.73M2
GLUCOSE BLD-MCNC: 117 MG/DL (ref 70–108)
HCT VFR BLD CALC: 45 % (ref 42–52)
HEMOGLOBIN: 15.7 GM/DL (ref 14–18)
IMMATURE GRANS (ABS): 0.03 THOU/MM3 (ref 0–0.07)
IMMATURE GRANULOCYTES: 0.3 %
LYMPHOCYTES # BLD: 24 %
LYMPHOCYTES ABSOLUTE: 2.4 THOU/MM3 (ref 1–4.8)
MCH RBC QN AUTO: 34.5 PG (ref 26–33)
MCHC RBC AUTO-ENTMCNC: 34.9 GM/DL (ref 32.2–35.5)
MCV RBC AUTO: 98.9 FL (ref 80–94)
MONOCYTES # BLD: 8.1 %
MONOCYTES ABSOLUTE: 0.8 THOU/MM3 (ref 0.4–1.3)
NUCLEATED RED BLOOD CELLS: 0 /100 WBC
OPIATES, URINE: NEGATIVE
OSMOLALITY CALCULATION: 275.3 MOSMOL/KG (ref 275–300)
OXYCODONE: NEGATIVE
PHENCYCLIDINE QUANTITATIVE URINE: NEGATIVE
PLATELET # BLD: 294 THOU/MM3 (ref 130–400)
PMV BLD AUTO: 10.4 FL (ref 9.4–12.4)
POTASSIUM REFLEX MAGNESIUM: 3.6 MEQ/L (ref 3.5–5.2)
PRO-BNP: 132.4 PG/ML (ref 0–450)
RBC # BLD: 4.55 MILL/MM3 (ref 4.7–6.1)
SEG NEUTROPHILS: 66.6 %
SEGMENTED NEUTROPHILS ABSOLUTE COUNT: 6.5 THOU/MM3 (ref 1.8–7.7)
SODIUM BLD-SCNC: 137 MEQ/L (ref 135–145)
TOTAL PROTEIN: 7.1 G/DL (ref 6.1–8)
TROPONIN T: < 0.01 NG/ML
TROPONIN T: < 0.01 NG/ML
WBC # BLD: 9.8 THOU/MM3 (ref 4.8–10.8)

## 2020-01-06 PROCEDURE — 80307 DRUG TEST PRSMV CHEM ANLYZR: CPT

## 2020-01-06 PROCEDURE — 84484 ASSAY OF TROPONIN QUANT: CPT

## 2020-01-06 PROCEDURE — 83880 ASSAY OF NATRIURETIC PEPTIDE: CPT

## 2020-01-06 PROCEDURE — 36415 COLL VENOUS BLD VENIPUNCTURE: CPT

## 2020-01-06 PROCEDURE — 2500000003 HC RX 250 WO HCPCS: Performed by: PHYSICIAN ASSISTANT

## 2020-01-06 PROCEDURE — 6370000000 HC RX 637 (ALT 250 FOR IP): Performed by: PHYSICIAN ASSISTANT

## 2020-01-06 PROCEDURE — 99285 EMERGENCY DEPT VISIT HI MDM: CPT

## 2020-01-06 PROCEDURE — 6360000002 HC RX W HCPCS: Performed by: PHYSICIAN ASSISTANT

## 2020-01-06 PROCEDURE — 93005 ELECTROCARDIOGRAM TRACING: CPT | Performed by: EMERGENCY MEDICINE

## 2020-01-06 PROCEDURE — 6360000002 HC RX W HCPCS

## 2020-01-06 PROCEDURE — 85025 COMPLETE CBC W/AUTO DIFF WBC: CPT

## 2020-01-06 PROCEDURE — 71046 X-RAY EXAM CHEST 2 VIEWS: CPT

## 2020-01-06 PROCEDURE — 80048 BASIC METABOLIC PNL TOTAL CA: CPT

## 2020-01-06 PROCEDURE — 80076 HEPATIC FUNCTION PANEL: CPT

## 2020-01-06 PROCEDURE — 85379 FIBRIN DEGRADATION QUANT: CPT

## 2020-01-06 PROCEDURE — 93010 ELECTROCARDIOGRAM REPORT: CPT | Performed by: INTERNAL MEDICINE

## 2020-01-06 RX ORDER — ONDANSETRON 2 MG/ML
4 INJECTION INTRAMUSCULAR; INTRAVENOUS ONCE
Status: COMPLETED | OUTPATIENT
Start: 2020-01-06 | End: 2020-01-06

## 2020-01-06 RX ORDER — MORPHINE SULFATE 2 MG/ML
INJECTION, SOLUTION INTRAMUSCULAR; INTRAVENOUS
Status: COMPLETED
Start: 2020-01-06 | End: 2020-01-06

## 2020-01-06 RX ORDER — FENTANYL CITRATE 50 UG/ML
50 INJECTION, SOLUTION INTRAMUSCULAR; INTRAVENOUS ONCE
Status: COMPLETED | OUTPATIENT
Start: 2020-01-06 | End: 2020-01-06

## 2020-01-06 RX ORDER — NITROGLYCERIN 20 MG/100ML
5 INJECTION INTRAVENOUS CONTINUOUS
Status: DISCONTINUED | OUTPATIENT
Start: 2020-01-06 | End: 2020-01-06 | Stop reason: HOSPADM

## 2020-01-06 RX ORDER — MORPHINE SULFATE 2 MG/ML
2 INJECTION, SOLUTION INTRAMUSCULAR; INTRAVENOUS ONCE
Status: COMPLETED | OUTPATIENT
Start: 2020-01-06 | End: 2020-01-06

## 2020-01-06 RX ORDER — ASPIRIN 81 MG/1
243 TABLET, CHEWABLE ORAL ONCE
Status: COMPLETED | OUTPATIENT
Start: 2020-01-06 | End: 2020-01-06

## 2020-01-06 RX ADMIN — FENTANYL CITRATE 50 MCG: 50 INJECTION INTRAMUSCULAR; INTRAVENOUS at 16:06

## 2020-01-06 RX ADMIN — NITROGLYCERIN 5 MCG/MIN: 20 INJECTION INTRAVENOUS at 12:07

## 2020-01-06 RX ADMIN — MORPHINE SULFATE 2 MG: 2 INJECTION, SOLUTION INTRAMUSCULAR; INTRAVENOUS at 12:07

## 2020-01-06 RX ADMIN — MORPHINE SULFATE 2 MG: 2 INJECTION, SOLUTION INTRAMUSCULAR; INTRAVENOUS at 13:09

## 2020-01-06 RX ADMIN — FENTANYL CITRATE 50 MCG: 50 INJECTION, SOLUTION INTRAMUSCULAR; INTRAVENOUS at 15:08

## 2020-01-06 RX ADMIN — ASPIRIN 81 MG 243 MG: 81 TABLET ORAL at 12:06

## 2020-01-06 RX ADMIN — ONDANSETRON 4 MG: 2 INJECTION INTRAMUSCULAR; INTRAVENOUS at 12:07

## 2020-01-06 ASSESSMENT — PAIN DESCRIPTION - LOCATION
LOCATION: CHEST

## 2020-01-06 ASSESSMENT — PAIN SCALES - GENERAL
PAINLEVEL_OUTOF10: 10
PAINLEVEL_OUTOF10: 9
PAINLEVEL_OUTOF10: 10

## 2020-01-06 ASSESSMENT — PAIN DESCRIPTION - FREQUENCY
FREQUENCY: CONTINUOUS

## 2020-01-06 ASSESSMENT — HEART SCORE: ECG: 0

## 2020-01-06 ASSESSMENT — PAIN DESCRIPTION - DESCRIPTORS
DESCRIPTORS: STABBING
DESCRIPTORS: STABBING

## 2020-01-06 ASSESSMENT — PAIN DESCRIPTION - PAIN TYPE
TYPE: ACUTE PAIN

## 2020-01-06 ASSESSMENT — ENCOUNTER SYMPTOMS
COUGH: 0
NAUSEA: 0
SHORTNESS OF BREATH: 1
ABDOMINAL PAIN: 0
VOMITING: 0

## 2020-01-06 NOTE — ED PROVIDER NOTES
mouth daily Historical Med      aspirin 81 MG tablet Take 81 mg by mouth daily. atorvastatin (LIPITOR) 10 MG tablet Take 80 mg by mouth daily. LORazepam (ATIVAN) 1 MG tablet Take 1 mg by mouth every 6 hours as needed for Anxiety. Historical Med      ramelteon (ROZEREM) 8 MG tablet Take 8 mg by mouth nightlyHistorical Med      ibuprofen (ADVIL;MOTRIN) 200 MG tablet Take 200 mg by mouth every 6 hours as needed for PainHistorical Med       !! - Potential duplicate medications found. Please discuss with provider. is allergic to pcn [penicillins]. FAMILY HISTORY     He indicated that his mother is alive. He indicated that his father is alive. He indicated that his brother is alive. family history includes Arthritis in his father; Heart Disease in his father; High Blood Pressure in his father; High Cholesterol in his father. SOCIAL HISTORY      reports that he quit smoking about 4 years ago. He smoked 1.00 pack per day. He has never used smokeless tobacco. He reports that he does not drink alcohol or use drugs. PHYSICAL EXAM     INITIAL VITALS:  height is 5' 6\" (1.676 m) and weight is 164 lb (74.4 kg). His oral temperature is 98.4 °F (36.9 °C). His blood pressure is 142/80 (abnormal) and his pulse is 64. His respiration is 12 and oxygen saturation is 98%. Physical Exam  Vitals signs and nursing note reviewed. Constitutional:       General: He is not in acute distress. Appearance: He is well-developed. He is not toxic-appearing or diaphoretic. Interventions: Nasal cannula in place. HENT:      Head: Normocephalic and atraumatic. Right Ear: Hearing normal.      Left Ear: Hearing normal.      Nose: Nose normal. No rhinorrhea. Mouth/Throat:      Pharynx: Uvula midline. No oropharyngeal exudate. Eyes:      General: Lids are normal. No scleral icterus. Conjunctiva/sclera: Conjunctivae normal.      Pupils: Pupils are equal, round, and reactive to light.    Neck: program.  Efforts were made to edit the dictations but occasionally words are mis-transcribed.)    Scribe:by: Devyn Bautista, 1/6/20 11:54 AM Scribing for and in the presence of James Yao PA-C. Provider:  I personally performed the services described in the documentation, reviewed and edited the documentation which was dictated to the scribe in my presence, and it accurately records my words and actions.     James Yao PA-C 1/6/20 8:06 PM        PAIGE Anand PA-C  01/07/20 2010

## 2020-01-06 NOTE — ED NOTES
Paperwork faxed to [facility]. A call to [facility] regarding transfer ETA. [facility] will be calling back with ETA.         Jo-Ann Montalvo RN  01/06/20 6577

## 2020-01-06 NOTE — ED NOTES
Patient updated on transferring information. VSS. Patient talking with father at bedside. Call light in reach. Will continue to monitor.       Victorio Holter Dwenger, RN  01/06/20 9698

## 2020-01-06 NOTE — ED NOTES
Verbal order for morphine 2 mg and to increase nitro from 5mcg/min to 7.5mcg/min.         Fritz Montalvo RN  01/06/20 6612

## 2020-01-06 NOTE — ED NOTES
Patient resting in cot, rates pain at a 9.5/10. medications given per verbal order. Patient given urinal to obtain urine sample. Patient and family updated on POC and waiting for chest xray to be finalized and ED provider will make calls for transfer to [facility]. Call light left in reach. Will continue to monitor.       Tiffany Montalvo RN  01/06/20 2107

## 2020-01-06 NOTE — ED NOTES
Patient and family interested in doing a transfer to [facility]. Patient and family updated on POC thus far and explained that ED provider was given [facility] cardiology number and will be calling with update on test results as they come back. Family appreciate update and verbalized understanding.       Savita Montalvo RN  01/06/20 8504

## 2020-01-06 NOTE — ED NOTES
Wife provided Dr. Kelly Butt phone number: [phone]. number given to ED provider.         Johanna Montalvo RN  01/06/20 9511

## 2020-01-07 NOTE — ED PROVIDER NOTES
ATTENDING NOTE:    I supervised and discussed the history, physical exam and the management of this patient with the PA. I reviewed the PA's note and agree with the documented findings and plan of care.       Electronically verified by Jaret Bahena MD  01/06/20 2987

## 2020-06-04 ENCOUNTER — APPOINTMENT (OUTPATIENT)
Dept: CT IMAGING | Age: 30
End: 2020-06-04
Payer: OTHER GOVERNMENT

## 2020-06-04 ENCOUNTER — HOSPITAL ENCOUNTER (EMERGENCY)
Age: 30
Discharge: HOME | End: 2020-06-04
Attending: FAMILY MEDICINE
Payer: OTHER GOVERNMENT

## 2020-06-04 VITALS
RESPIRATION RATE: 16 BRPM | SYSTOLIC BLOOD PRESSURE: 104 MMHG | HEART RATE: 66 BPM | TEMPERATURE: 98.9 F | OXYGEN SATURATION: 97 % | HEIGHT: 66 IN | DIASTOLIC BLOOD PRESSURE: 60 MMHG | WEIGHT: 160 LBS | BODY MASS INDEX: 25.71 KG/M2

## 2020-06-04 LAB
ANION GAP SERPL CALCULATED.3IONS-SCNC: 12 MEQ/L (ref 8–16)
BASOPHILS # BLD: 0.1 %
BASOPHILS ABSOLUTE: 0 THOU/MM3 (ref 0–0.1)
BUN BLDV-MCNC: 8 MG/DL (ref 7–22)
CALCIUM SERPL-MCNC: 8.8 MG/DL (ref 8.5–10.5)
CHLORIDE BLD-SCNC: 100 MEQ/L (ref 98–111)
CO2: 25 MEQ/L (ref 23–33)
CREAT SERPL-MCNC: 1.1 MG/DL (ref 0.4–1.2)
EOSINOPHIL # BLD: 0 %
EOSINOPHILS ABSOLUTE: 0 THOU/MM3 (ref 0–0.4)
ERYTHROCYTE [DISTWIDTH] IN BLOOD BY AUTOMATED COUNT: 12.7 % (ref 11.5–14.5)
ERYTHROCYTE [DISTWIDTH] IN BLOOD BY AUTOMATED COUNT: 47.6 FL (ref 35–45)
GFR SERPL CREATININE-BSD FRML MDRD: 78 ML/MIN/1.73M2
GLUCOSE BLD-MCNC: 113 MG/DL (ref 70–108)
GROUP A STREP CULTURE, REFLEX: NEGATIVE
HCT VFR BLD CALC: 44.9 % (ref 42–52)
HEMOGLOBIN: 14.8 GM/DL (ref 14–18)
IMMATURE GRANS (ABS): 0.03 THOU/MM3 (ref 0–0.07)
IMMATURE GRANULOCYTES: 0.4 %
LYMPHOCYTES # BLD: 9.9 %
LYMPHOCYTES ABSOLUTE: 0.8 THOU/MM3 (ref 1–4.8)
MCH RBC QN AUTO: 33.7 PG (ref 26–33)
MCHC RBC AUTO-ENTMCNC: 33 GM/DL (ref 32.2–35.5)
MCV RBC AUTO: 102.3 FL (ref 80–94)
MONOCYTES # BLD: 9.8 %
MONOCYTES ABSOLUTE: 0.8 THOU/MM3 (ref 0.4–1.3)
NUCLEATED RED BLOOD CELLS: 0 /100 WBC
OSMOLALITY CALCULATION: 273 MOSMOL/KG (ref 275–300)
PLATELET # BLD: 180 THOU/MM3 (ref 130–400)
PMV BLD AUTO: 10.2 FL (ref 9.4–12.4)
POTASSIUM REFLEX MAGNESIUM: 4 MEQ/L (ref 3.5–5.2)
RBC # BLD: 4.39 MILL/MM3 (ref 4.7–6.1)
REFLEX THROAT C + S: NORMAL
SEG NEUTROPHILS: 79.8 %
SEGMENTED NEUTROPHILS ABSOLUTE COUNT: 6.6 THOU/MM3 (ref 1.8–7.7)
SODIUM BLD-SCNC: 137 MEQ/L (ref 135–145)
WBC # BLD: 8.3 THOU/MM3 (ref 4.8–10.8)

## 2020-06-04 PROCEDURE — 80048 BASIC METABOLIC PNL TOTAL CA: CPT

## 2020-06-04 PROCEDURE — 96375 TX/PRO/DX INJ NEW DRUG ADDON: CPT

## 2020-06-04 PROCEDURE — 96366 THER/PROPH/DIAG IV INF ADDON: CPT

## 2020-06-04 PROCEDURE — 87040 BLOOD CULTURE FOR BACTERIA: CPT

## 2020-06-04 PROCEDURE — 70491 CT SOFT TISSUE NECK W/DYE: CPT

## 2020-06-04 PROCEDURE — 6360000002 HC RX W HCPCS: Performed by: STUDENT IN AN ORGANIZED HEALTH CARE EDUCATION/TRAINING PROGRAM

## 2020-06-04 PROCEDURE — 87880 STREP A ASSAY W/OPTIC: CPT

## 2020-06-04 PROCEDURE — 6360000004 HC RX CONTRAST MEDICATION: Performed by: FAMILY MEDICINE

## 2020-06-04 PROCEDURE — 99283 EMERGENCY DEPT VISIT LOW MDM: CPT

## 2020-06-04 PROCEDURE — 99215 OFFICE O/P EST HI 40 MIN: CPT

## 2020-06-04 PROCEDURE — 96365 THER/PROPH/DIAG IV INF INIT: CPT

## 2020-06-04 PROCEDURE — 36415 COLL VENOUS BLD VENIPUNCTURE: CPT

## 2020-06-04 PROCEDURE — 99213 OFFICE O/P EST LOW 20 MIN: CPT | Performed by: NURSE PRACTITIONER

## 2020-06-04 PROCEDURE — 87070 CULTURE OTHR SPECIMN AEROBIC: CPT

## 2020-06-04 PROCEDURE — 6370000000 HC RX 637 (ALT 250 FOR IP): Performed by: STUDENT IN AN ORGANIZED HEALTH CARE EDUCATION/TRAINING PROGRAM

## 2020-06-04 PROCEDURE — 2580000003 HC RX 258: Performed by: STUDENT IN AN ORGANIZED HEALTH CARE EDUCATION/TRAINING PROGRAM

## 2020-06-04 PROCEDURE — 85025 COMPLETE CBC W/AUTO DIFF WBC: CPT

## 2020-06-04 RX ORDER — CLINDAMYCIN HYDROCHLORIDE 300 MG/1
300 CAPSULE ORAL 3 TIMES DAILY
Qty: 30 CAPSULE | Refills: 0 | Status: SHIPPED | OUTPATIENT
Start: 2020-06-04 | End: 2020-06-14

## 2020-06-04 RX ORDER — KETOROLAC TROMETHAMINE 30 MG/ML
30 INJECTION, SOLUTION INTRAMUSCULAR; INTRAVENOUS ONCE
Status: COMPLETED | OUTPATIENT
Start: 2020-06-04 | End: 2020-06-04

## 2020-06-04 RX ORDER — ACETAMINOPHEN 325 MG/1
650 TABLET ORAL ONCE
Status: COMPLETED | OUTPATIENT
Start: 2020-06-04 | End: 2020-06-04

## 2020-06-04 RX ORDER — SODIUM CHLORIDE 9 MG/ML
1000 INJECTION, SOLUTION INTRAVENOUS CONTINUOUS
Status: DISCONTINUED | OUTPATIENT
Start: 2020-06-04 | End: 2020-06-04 | Stop reason: HOSPADM

## 2020-06-04 RX ADMIN — IOPAMIDOL 80 ML: 755 INJECTION, SOLUTION INTRAVENOUS at 10:25

## 2020-06-04 RX ADMIN — SODIUM CHLORIDE 1000 ML: 9 INJECTION, SOLUTION INTRAVENOUS at 09:49

## 2020-06-04 RX ADMIN — ACETAMINOPHEN 650 MG: 325 TABLET ORAL at 10:46

## 2020-06-04 RX ADMIN — VANCOMYCIN HYDROCHLORIDE 1500 MG: 1 INJECTION, POWDER, LYOPHILIZED, FOR SOLUTION INTRAVENOUS at 10:36

## 2020-06-04 RX ADMIN — KETOROLAC TROMETHAMINE 30 MG: 30 INJECTION, SOLUTION INTRAMUSCULAR at 10:10

## 2020-06-04 ASSESSMENT — ENCOUNTER SYMPTOMS
SHORTNESS OF BREATH: 0
BACK PAIN: 0
EYE PAIN: 0
DIARRHEA: 0
SINUS PRESSURE: 0
TROUBLE SWALLOWING: 1
SORE THROAT: 1
ABDOMINAL PAIN: 0
SINUS CONGESTION: 0
COUGH: 0
TROUBLE SWALLOWING: 0
RHINORRHEA: 0
CONSTIPATION: 0
SORE THROAT: 0
NAUSEA: 0
VOMITING: 0

## 2020-06-04 ASSESSMENT — PAIN - FUNCTIONAL ASSESSMENT: PAIN_FUNCTIONAL_ASSESSMENT: PREVENTS OR INTERFERES SOME ACTIVE ACTIVITIES AND ADLS

## 2020-06-04 ASSESSMENT — PAIN DESCRIPTION - ORIENTATION: ORIENTATION: RIGHT

## 2020-06-04 ASSESSMENT — PAIN DESCRIPTION - PROGRESSION: CLINICAL_PROGRESSION: NOT CHANGED

## 2020-06-04 ASSESSMENT — PAIN SCALES - GENERAL
PAINLEVEL_OUTOF10: 6
PAINLEVEL_OUTOF10: 3
PAINLEVEL_OUTOF10: 6
PAINLEVEL_OUTOF10: 4

## 2020-06-04 ASSESSMENT — PAIN DESCRIPTION - ONSET: ONSET: PROGRESSIVE

## 2020-06-04 ASSESSMENT — PAIN DESCRIPTION - PAIN TYPE: TYPE: ACUTE PAIN

## 2020-06-04 ASSESSMENT — PAIN DESCRIPTION - FREQUENCY: FREQUENCY: CONTINUOUS

## 2020-06-04 ASSESSMENT — PAIN DESCRIPTION - DESCRIPTORS: DESCRIPTORS: ACHING

## 2020-06-04 ASSESSMENT — PAIN DESCRIPTION - LOCATION: LOCATION: THROAT

## 2020-06-04 NOTE — PROGRESS NOTES
Patient released in stable condition. Instructed to go directly to [facility] emergency department for further evaluation and treatment. Patient verbalized understanding. Ambulated, per self, to private car.

## 2020-06-04 NOTE — ED NOTES
Pt medicated for elevated temp. Pt rprts feeling parched. Dry cotton mouth. Mouth swab provided. Pt tolerating well.       Dominguez Simms RN  06/04/20 2727

## 2020-06-04 NOTE — ED PROVIDER NOTES
[facility] ENCOUNTER  Resident Note      CHIEF COMPLAINT       Chief Complaint   Patient presents with    Fever    Pharyngitis       Nurses Notes reviewed and I agree except as noted in the HPI. HISTORY OF PRESENT ILLNESS    Jocelyne Cardenas is a 26 y.o. male who presents from [facility] urgent care for sore throat. Patient states he developed a sore throat about 2 days ago and it has been getting progressively worse. Endorses fevers/chills. Has been tolerating fluids PO though painful, unable to tolerate solids today. Endorses neck pain. States breathing feels \"different\" though breathing easy. Denies any N/V. Denies any prior tonsil problems but states he had a MRSA lip infection about a year ago. States at the urgent care, they swabbed for strep and then transferred him over. Has tried erasmo tylenol without much relief       REVIEW OF SYSTEMS     Review of Systems   Constitutional: Positive for chills, fatigue and fever. HENT: Positive for ear pain, sore throat and trouble swallowing. Negative for congestion and rhinorrhea. Eyes: Negative for pain and visual disturbance. Respiratory: Negative for cough and shortness of breath. Cardiovascular: Negative for chest pain and palpitations. Gastrointestinal: Negative for abdominal pain, constipation, diarrhea, nausea and vomiting. Genitourinary: Negative for dysuria and urgency. Skin: Negative for rash and wound. Neurological: Negative for numbness and headaches. Psychiatric/Behavioral: Negative for agitation and behavioral problems. PAST MEDICAL HISTORY    has a past medical history of CAD (coronary artery disease), CHF (congestive heart failure) ([facility] [address] Hyperlipidemia, Hypertension, and Psychiatric problem. SURGICAL HISTORY      has a past surgical history that includes Cardiac surgery (07-); Coronary angioplasty with stent (09/19/2014); Elbow surgery (Left);  Arm Surgery (Left); and fracture surgery. CURRENT MEDICATIONS       Previous Medications    ALLOPURINOL (ZYLOPRIM) 100 MG TABLET    Take 100 mg by mouth daily Unsure of dose    AMLODIPINE (NORVASC) 10 MG TABLET    Take 10 mg by mouth daily    ASPIRIN 81 MG TABLET    Take 81 mg by mouth daily. ATORVASTATIN (LIPITOR) 10 MG TABLET    Take 80 mg by mouth daily. CLOPIDOGREL (PLAVIX) 75 MG TABLET    Take 75 mg by mouth daily    FUROSEMIDE (LASIX) 20 MG TABLET    Take 20 mg by mouth as needed    IBUPROFEN (ADVIL;MOTRIN) 200 MG TABLET    Take 200 mg by mouth every 6 hours as needed for Pain    ISOSORBIDE MONONITRATE (IMDUR) 30 MG CR TABLET    Take 120 mg by mouth daily     LISINOPRIL (PRINIVIL;ZESTRIL) 2.5 MG TABLET    Take 1 tablet by mouth daily. LORAZEPAM (ATIVAN) 1 MG TABLET    Take 1 mg by mouth every 6 hours as needed for Anxiety. METOPROLOL SUCCINATE (TOPROL XL) 50 MG EXTENDED RELEASE TABLET    Take 50 mg by mouth daily    METOPROLOL TARTRATE (LOPRESSOR) 50 MG TABLET    Take 50 mg by mouth once    NITROGLYCERIN (NITROSTAT) 0.4 MG SL TABLET    Place 1 tablet under the tongue every 5 minutes as needed for Chest pain. RAMELTEON (ROZEREM) 8 MG TABLET    Take 8 mg by mouth nightly    RANITIDINE (ZANTAC) 150 MG CAPSULE    Take 150 mg by mouth once    RANOLAZINE (RANEXA) 500 MG SR TABLET    Take 1 tablet by mouth 2 times daily. SPIRONOLACTONE (ALDACTONE) 25 MG TABLET    Take 12.5 mg by mouth daily       ALLERGIES     is allergic to pcn [penicillins]. FAMILY HISTORY     He indicated that his mother is alive. He indicated that his father is alive. He indicated that his brother is alive. family history includes Arthritis in his father; Heart Disease in his father; High Blood Pressure in his father; High Cholesterol in his father. SOCIAL HISTORY      reports that he quit smoking about 5 years ago. He smoked 1.00 pack per day. He has never used smokeless tobacco. He reports that he does not drink alcohol or use drugs.     PHYSICAL EXAM     INITIAL VITALS:  height is 5' 6\" (1.676 m) and weight is 160 lb (72.6 kg). His oral temperature is 101.6 °F (38.7 °C). His blood pressure is 127/65 and his pulse is 79. His respiration is 17 and oxygen saturation is 94%. Physical Exam  Vitals signs and nursing note reviewed. Constitutional:       General: He is not in acute distress. Appearance: He is well-developed. He is not diaphoretic. HENT:      Head: Normocephalic and atraumatic. Right Ear: Tympanic membrane and external ear normal.      Left Ear: Tympanic membrane and external ear normal.      Nose: Nose normal.      Mouth/Throat:      Mouth: Mucous membranes are moist.      Tonsils: Tonsillar exudate present. 3+ on the right. 2+ on the left. Comments: Uvula deviation to the left  Eyes:      General:         Right eye: No discharge. Left eye: No discharge. Conjunctiva/sclera: Conjunctivae normal.   Cardiovascular:      Rate and Rhythm: Normal rate and regular rhythm. Heart sounds: Normal heart sounds. No murmur. Pulmonary:      Effort: Pulmonary effort is normal.      Breath sounds: Normal breath sounds. No wheezing. Abdominal:      General: There is no distension. Palpations: Abdomen is soft. Tenderness: There is no abdominal tenderness. Skin:     General: Skin is warm and dry. Findings: No erythema or rash. Neurological:      Mental Status: He is alert and oriented to person, place, and time. Cranial Nerves: No cranial nerve deficit. Psychiatric:         Behavior: Behavior normal.         Thought Content:  Thought content normal.         Judgment: Judgment normal.          DIFFERENTIAL DIAGNOSIS:   Peritonsillar abscess vs tonsillar infection vs strep throat      DIAGNOSTIC RESULTS     EKG: All EKG's are interpreted by the Emergency Department Physician who either signs or Co-signs this chart in the absence of a cardiologist.      RADIOLOGY: non-plain film images(s) such as CT, Ultrasound and MRI are read by the radiologist.  CT SOFT TISSUE NECK W CONTRAST   Final Result      1. Enlarged tonsils consistent with tonsillitis. This is more pronounced on the right than the left. There is no abscess at this time. 2. Cervical adenopathy. This is likely reflective. **This report has been created using voice recognition software. It may contain minor errors which are inherent in voice recognition technology. **      Final report electronically signed by Dr. Idelia Homans on 6/4/2020 10:55 AM            LABS:   Labs Reviewed   CBC WITH AUTO DIFFERENTIAL - Abnormal; Notable for the following components:       Result Value    RBC 4.39 (*)     .3 (*)     MCH 33.7 (*)     RDW-SD 47.6 (*)     Lymphocytes Absolute 0.8 (*)     All other components within normal limits   BASIC METABOLIC PANEL W/ REFLEX TO MG FOR LOW K - Abnormal; Notable for the following components:    Glucose 113 (*)     All other components within normal limits   OSMOLALITY - Abnormal; Notable for the following components:    Osmolality Calc 273.0 (*)     All other components within normal limits   GLOMERULAR FILTRATION RATE, ESTIMATED - Abnormal; Notable for the following components:    Est, Glom Filt Rate 78 (*)     All other components within normal limits   STREP A CULTURE, THROAT   CULTURE, BLOOD 1   CULTURE, BLOOD 2   CULTURE, THROAT    Narrative:     Source: throat       Site:           Current Antibiotics: none   STREP A ANTIGEN   ANION GAP   GROUP A STREP, REFLEX       EMERGENCY DEPARTMENT COURSE:   Vitals:    Vitals:    06/04/20 0916 06/04/20 0917 06/04/20 1002 06/04/20 1035   BP: 121/70  127/65    Pulse:    79   Resp:    17   Temp:    101.6 °F (38.7 °C)   TempSrc:    Oral   SpO2: 94%  93% 94%   Weight:  160 lb (72.6 kg)     Height:  5' 6\" (1.676 m)         MDM  Patient was seen and evaluated. Patient was in no acute distress. Vitals signs stable.  Patient with 2 day hx of sore throat and trouble swallowing. Tolerating secretions. 3-4+ R tonsil. Appropriate labs and imaging was ordered. IVF started. Toradol and tylenol was given for pain. IV vanco was given. CT neck did not show any sign of abscess. Labs were unremarkable, no leukocytosis. Rapid strep was negative. Could be viral tonsillitis, however given the severity, will treat with antibiotics. Will use clinda due to hx of penicillin allergy. Patient agreeable with plan, discharged in stable condition. CRITICAL CARE:       CONSULTS:      PROCEDURES:  None    FINAL IMPRESSION      1.  Acute tonsillitis, unspecified etiology          DISPOSITION/PLAN   Home     PATIENT REFERRED TO:  Rigoberto Hanna MD  [address] [place] [address]  [phone]      As needed      DISCHARGE MEDICATIONS:  New Prescriptions    CLINDAMYCIN (CLEOCIN) 300 MG CAPSULE    Take 1 capsule by mouth 3 times daily for 10 days       (Please note that portions of this note were completed with a voice recognition program.  Efforts were made to edit the dictations but occasionally words are mis-transcribed.)    MD Erika Marte MD  Resident  06/04/20 26 [id number]

## 2020-06-04 NOTE — ED PROVIDER NOTES
Surgery (Left); and fracture surgery. CURRENT MEDICATIONS       Current Discharge Medication List      CONTINUE these medications which have NOT CHANGED    Details   amLODIPine (NORVASC) 10 MG tablet Take 10 mg by mouth daily      metoprolol succinate (TOPROL XL) 50 MG extended release tablet Take 50 mg by mouth daily      ramelteon (ROZEREM) 8 MG tablet Take 8 mg by mouth nightly      ibuprofen (ADVIL;MOTRIN) 200 MG tablet Take 200 mg by mouth every 6 hours as needed for Pain      spironolactone (ALDACTONE) 25 MG tablet Take 12.5 mg by mouth daily      clopidogrel (PLAVIX) 75 MG tablet Take 75 mg by mouth daily      allopurinol (ZYLOPRIM) 100 MG tablet Take 100 mg by mouth daily Unsure of dose      metoprolol tartrate (LOPRESSOR) 50 MG tablet Take 50 mg by mouth once      ranitidine (ZANTAC) 150 MG capsule Take 150 mg by mouth once      furosemide (LASIX) 20 MG tablet Take 20 mg by mouth as needed      nitroGLYCERIN (NITROSTAT) 0.4 MG SL tablet Place 1 tablet under the tongue every 5 minutes as needed for Chest pain. Qty: 25 tablet, Refills: 3      ranolazine (RANEXA) 500 MG SR tablet Take 1 tablet by mouth 2 times daily. Qty: 60 tablet, Refills: 3      lisinopril (PRINIVIL;ZESTRIL) 2.5 MG tablet Take 1 tablet by mouth daily. Qty: 30 tablet, Refills: 3      isosorbide mononitrate (IMDUR) 30 MG CR tablet Take 120 mg by mouth daily       aspirin 81 MG tablet Take 81 mg by mouth daily. atorvastatin (LIPITOR) 10 MG tablet Take 80 mg by mouth daily. LORazepam (ATIVAN) 1 MG tablet Take 1 mg by mouth every 6 hours as needed for Anxiety. ALLERGIES     Patient is is allergic to pcn [penicillins]. Patients   There is no immunization history on file for this patient. FAMILY HISTORY     Patient's family history includes Arthritis in his father; Heart Disease in his father; High Blood Pressure in his father; High Cholesterol in his father.     SOCIAL HISTORY     Patient  reports that he quit Breath sounds: Normal breath sounds. No decreased air movement. No decreased breath sounds, wheezing, rhonchi or rales. Comments: No acute respiratory distress noted  Abdominal:      General: Abdomen is flat. Bowel sounds are normal. There is no distension. Palpations: Abdomen is soft. Tenderness: There is no abdominal tenderness. Lymphadenopathy:      Head:      Right side of head: No submental, submandibular, tonsillar, preauricular, posterior auricular or occipital adenopathy. Left side of head: No submental, submandibular, tonsillar, preauricular, posterior auricular or occipital adenopathy. Cervical: No cervical adenopathy. Right cervical: No superficial, deep or posterior cervical adenopathy. Left cervical: No superficial, deep or posterior cervical adenopathy. Skin:     General: Skin is warm and dry. Capillary Refill: Capillary refill takes less than 2 seconds. Neurological:      General: No focal deficit present. Mental Status: He is alert and oriented to person, place, and time. Psychiatric:         Mood and Affect: Mood normal.         Speech: Speech normal.         Behavior: Behavior normal. Behavior is cooperative. DIAGNOSTIC RESULTS     Labs:  Results for orders placed or performed during the hospital encounter of 06/04/20   STREP A ANTIGEN   Result Value Ref Range    GROUP A STREP CULTURE, REFLEX Negative        IMAGING:    No orders to display         EKG:      URGENT CARE COURSE:     Vitals:    06/04/20 0753   BP: 125/69   Pulse: 88   Resp: 20   Temp: 100.9 °F (38.3 °C)   TempSrc: Oral   SpO2: 94%       Medications - No data to display         PROCEDURES:  None    FINAL IMPRESSION      1.  Tonsillar abscess          DISPOSITION/ PLAN      After reviewing the patients History of Present illness, Vital Signs,Clinical Findings,Comorbities, and Clinical Data obtained I discussed with the patient or patient representative that there is a very real

## 2020-06-04 NOTE — ED PROVIDER NOTES
[facility]  eMERGENCY dEPARTMENT eNCOUnter   Physician Attestation    Pt Name: Steven Caban  MRN: [id number]  [place] 1990  Date of evaluation: 6/4/20        Physician Note:    Evaluation:  I have personally performed a face-to-face evaluation on this patient. I have reviewed Dr. Deepthi Amado findings and agree. History and exam by me shows the following information.     Sore throat started 2 days ago    Fever, chills, sweats yesterday and today    Urgent care noted asymmetric pharyngitis/tonsillitis right greater than left    Exam showed no neck adenopathy    Enlarged right tonsil with erythema    Trachea midline    Lungs clear    Heart regular rhythm    Give IV hydration    Start vancomycin for possible peritonsillar abscess    Note patient is penicillin allergic, hives    CT scan of the neck revealed tonsillar enlargement but no evidence of abscess per radiology reading    Normal WBC    After vancomycin infused will discharge home on clindamycin              1. Acute tonsillitis, unspecified etiology          DISPOSITION/PLAN  PATIENT REFERRED TO:  Trinidad Bowman MD  [address] [id number] [phone]      As needed    DISCHARGE MEDICATIONS:  New Prescriptions    CLINDAMYCIN (CLEOCIN) 300 MG CAPSULE    Take 1 capsule by mouth 3 times daily for 10 days         MD Tello Nolasco MD  06/04/20 8386

## 2020-06-05 ENCOUNTER — CARE COORDINATION (OUTPATIENT)
Dept: CARE COORDINATION | Age: 30
End: 2020-06-05

## 2020-06-05 NOTE — CARE COORDINATION
Attempted to reach Herberth Handler today for ED f/u [place]. No answer. Message left to return call to this AC at [phone].

## 2020-06-06 LAB — THROAT/NOSE CULTURE: NORMAL

## 2020-06-09 LAB
BLOOD CULTURE, ROUTINE: NORMAL
BLOOD CULTURE, ROUTINE: NORMAL

## 2020-06-10 ENCOUNTER — CARE COORDINATION (OUTPATIENT)
Dept: CARE COORDINATION | Age: 30
End: 2020-06-10

## 2020-08-10 ENCOUNTER — APPOINTMENT (OUTPATIENT)
Dept: GENERAL RADIOLOGY | Age: 30
End: 2020-08-10
Payer: OTHER GOVERNMENT

## 2020-08-10 ENCOUNTER — HOSPITAL ENCOUNTER (EMERGENCY)
Age: 30
Discharge: HOME | End: 2020-08-10
Payer: OTHER GOVERNMENT

## 2020-08-10 VITALS
TEMPERATURE: 97.2 F | OXYGEN SATURATION: 98 % | DIASTOLIC BLOOD PRESSURE: 73 MMHG | HEART RATE: 62 BPM | RESPIRATION RATE: 16 BRPM | SYSTOLIC BLOOD PRESSURE: 119 MMHG | BODY MASS INDEX: 25.71 KG/M2 | HEIGHT: 66 IN | WEIGHT: 160 LBS

## 2020-08-10 PROCEDURE — 99213 OFFICE O/P EST LOW 20 MIN: CPT

## 2020-08-10 PROCEDURE — 73630 X-RAY EXAM OF FOOT: CPT

## 2020-08-10 ASSESSMENT — PAIN DESCRIPTION - DESCRIPTORS: DESCRIPTORS: ACHING;DISCOMFORT

## 2020-08-10 ASSESSMENT — PAIN DESCRIPTION - PAIN TYPE: TYPE: ACUTE PAIN

## 2020-08-10 ASSESSMENT — PAIN SCALES - GENERAL: PAINLEVEL_OUTOF10: 3

## 2020-08-10 ASSESSMENT — PAIN DESCRIPTION - FREQUENCY: FREQUENCY: CONTINUOUS

## 2020-08-10 ASSESSMENT — PAIN DESCRIPTION - LOCATION: LOCATION: FOOT

## 2020-08-10 ASSESSMENT — PAIN DESCRIPTION - ONSET: ONSET: ON-GOING

## 2020-08-10 ASSESSMENT — PAIN DESCRIPTION - ORIENTATION: ORIENTATION: RIGHT

## 2020-08-10 NOTE — ED TRIAGE NOTES
Pt to urgent care due to a right foot injury that occurred on Sunday when he was playing soccer with a child.

## 2020-08-10 NOTE — ED PROVIDER NOTES
[address]  Urgent Care Encounter       CHIEF COMPLAINT       Chief Complaint   Patient presents with    Foot Injury     right foot       Nurses Notes reviewed and I agree except as noted in the HPI. HISTORY OF PRESENT ILLNESS   Duong Lopez is a 26 y.o. male who presents     Patient is present in the urgent care today with injury to right foot that occurred yesterday as he was trying to kick a soccer ball, and missed. He states that his right greater toe kicked directly into the ground, leaving an indent in the ground. He does currently take Plavix and aspirin for stent placement. He is able to move and bend toes of right foot, however he has limited range of motion to right greater toe. There is noted to be moderate swelling and bruising to proximal joint of right greater toe. He is able to bear weight, however he states that \" putting pressure, on toes, is extremely painful\". REVIEW OF SYSTEMS     Review of Systems   Constitutional: Negative for chills, fatigue and fever. Musculoskeletal: Positive for arthralgias (right greater, 2nd and 3rd digits) and joint swelling (right great toe, proximal joint). Negative for myalgias. Skin: Negative for rash. Neurological: Negative for dizziness, weakness, light-headedness, numbness and headaches. PAST MEDICAL HISTORY         Diagnosis Date    CAD (coronary artery disease)     CHF (congestive heart failure) (HCC)     Hyperlipidemia     Hypertension     Psychiatric problem        SURGICALHISTORY     Patient  has a past surgical history that includes Cardiac surgery (07-); Coronary angioplasty with stent (09/19/2014); Elbow surgery (Left); Arm Surgery (Left); and fracture surgery.     CURRENT MEDICATIONS       Discharge Medication List as of 8/10/2020  9:07 AM      CONTINUE these medications which have NOT CHANGED    Details   amLODIPine (NORVASC) 10 MG tablet Take 10 mg by mouth dailyHistorical Med metoprolol succinate (TOPROL XL) 50 MG extended release tablet Take 50 mg by mouth dailyHistorical Med      LORazepam (ATIVAN) 1 MG tablet Take 1 mg by mouth every 6 hours as needed for Anxiety. Historical Med      ramelteon (ROZEREM) 8 MG tablet Take 8 mg by mouth nightlyHistorical Med      ibuprofen (ADVIL;MOTRIN) 200 MG tablet Take 200 mg by mouth every 6 hours as needed for PainHistorical Med      spironolactone (ALDACTONE) 25 MG tablet Take 12.5 mg by mouth dailyHistorical Med      clopidogrel (PLAVIX) 75 MG tablet Take 75 mg by mouth dailyHistorical Med      allopurinol (ZYLOPRIM) 100 MG tablet Take 100 mg by mouth daily Unsure of doseHistorical Med      metoprolol tartrate (LOPRESSOR) 50 MG tablet Take 50 mg by mouth onceHistorical Med      ranitidine (ZANTAC) 150 MG capsule Take 150 mg by mouth onceHistorical Med      furosemide (LASIX) 20 MG tablet Take 20 mg by mouth as neededHistorical Med      nitroGLYCERIN (NITROSTAT) 0.4 MG SL tablet Place 1 tablet under the tongue every 5 minutes as needed for Chest pain., Disp-25 tablet, R-3      ranolazine (RANEXA) 500 MG SR tablet Take 1 tablet by mouth 2 times daily. , Disp-60 tablet, R-3      lisinopril (PRINIVIL;ZESTRIL) 2.5 MG tablet Take 1 tablet by mouth daily. , Disp-30 tablet, R-3      isosorbide mononitrate (IMDUR) 30 MG CR tablet Take 120 mg by mouth daily Historical Med      aspirin 81 MG tablet Take 81 mg by mouth daily. atorvastatin (LIPITOR) 10 MG tablet Take 80 mg by mouth daily. ALLERGIES     Patient is is allergic to pcn [penicillins]. Patients   There is no immunization history on file for this patient. FAMILY HISTORY     Patient's family history includes Arthritis in his father; Heart Disease in his father; High Blood Pressure in his father; High Cholesterol in his father. SOCIAL HISTORY     Patient  reports that he quit smoking about 5 years ago. He smoked 1.00 pack per day.  He has never used smokeless tobacco. He reports that he does not drink alcohol or use drugs. PHYSICAL EXAM     ED TRIAGE VITALS  BP: 119/73, Temp: 97.2 °F (36.2 °C), Pulse: 62, Resp: 16, SpO2: 98 %,Estimated body mass index is 25.82 kg/m² as calculated from the following:    Height as of this encounter: 5' 6\" (1.676 m). Weight as of this encounter: 160 lb (72.6 kg). ,No LMP for male patient. Physical Exam  Constitutional:       General: He is not in acute distress. Appearance: Normal appearance. He is not ill-appearing, toxic-appearing or diaphoretic. Cardiovascular:      Pulses: Normal pulses. Musculoskeletal: Normal range of motion. General: Swelling (moderate near proximal joint of right great toe), tenderness (right foot, great toe, 2nd and 3rd digits) and signs of injury present. No deformity. Skin:     General: Skin is warm. Capillary Refill: Capillary refill takes less than 2 seconds. Findings: No erythema or rash. Neurological:      General: No focal deficit present. Mental Status: He is alert and oriented to person, place, and time. Sensory: No sensory deficit. Psychiatric:         Mood and Affect: Mood normal.         Behavior: Behavior normal.         Thought Content: Thought content normal.         Judgment: Judgment normal.         DIAGNOSTIC RESULTS     Labs:No results found for this visit on 08/10/20. IMAGING:    XR FOOT RIGHT (MIN 3 VIEWS)   Final Result   1. There is no acute fracture. 2. Few additional findings as described in the body of the report. **This report has been created using voice recognition software. It may contain minor errors which are inherent in voice recognition technology. **      Final report electronically signed by Dr. Shoaib Galvan on 8/10/2020 8:50 AM        URGENT CARE COURSE:     Vitals:    08/10/20 0829   BP: 119/73   Pulse: 62   Resp: 16   Temp: 97.2 °F (36.2 °C)   TempSrc: Temporal   SpO2: 98%   Weight: 160 lb (72.6 kg)   Height: 5' 6\" (1.676 m) Medications - No data to display         PROCEDURES:  None    FINAL IMPRESSION      1. Strain of muscle and tendon of long extensor muscle of toe at ankle and foot level, right foot, initial encounter          DISPOSITION/ PLAN   Patient is discharged home with instructions to follow-up with [facility], for suspected injury to talus of right foot, that may require further screening such as MRI. Discussed with patient that there are no acute fractures found on x-ray as read by the radiologist.  Patient is sent home with postop shoe and crutches, and told to not bear weight until seen by the orthopedic. He is informed that there is a walk-in clinic available today, and he would prefer to go there versus make an appointment for later.          PATIENT REFERRED TO:  Adri Mendez MD  [address] [place] [address] / [facility] [id number]      DISCHARGE MEDICATIONS:  Discharge Medication List as of 8/10/2020  9:07 AM          Discharge Medication List as of 8/10/2020  9:07 AM          Discharge Medication List as of 8/10/2020  9:07 AM          Gladys Schwab, APRN - NP    (Please note that portions of this note were completed with a voice recognition program. Efforts were made to edit the dictations but occasionally words are mis-transcribed.)         ARJUN Thomas NP  08/10/20 1107

## 2020-08-10 NOTE — ED NOTES
Pt plans to follow up with the [address] [place] [address] today for further evaluation. Discharge instructions  reviewed with pt. Pt verbalized understanding. Pt out in stable condition via crutches.      Hayder Ascencio RN  08/10/20 6841

## 2021-10-10 ENCOUNTER — APPOINTMENT (OUTPATIENT)
Dept: GENERAL RADIOLOGY | Age: 31
DRG: 552 | End: 2021-10-10
Payer: OTHER MISCELLANEOUS

## 2021-10-10 ENCOUNTER — HOSPITAL ENCOUNTER (INPATIENT)
Age: 31
LOS: 1 days | Discharge: HOME | DRG: 552 | End: 2021-10-12
Attending: EMERGENCY MEDICINE | Admitting: SURGERY
Payer: OTHER MISCELLANEOUS

## 2021-10-10 ENCOUNTER — APPOINTMENT (OUTPATIENT)
Dept: CT IMAGING | Age: 31
DRG: 552 | End: 2021-10-10
Payer: OTHER MISCELLANEOUS

## 2021-10-10 DIAGNOSIS — S32.010A CLOSED COMPRESSION FRACTURE OF BODY OF L1 VERTEBRA (HCC): Primary | ICD-10-CM

## 2021-10-10 DIAGNOSIS — S09.90XA CLOSED HEAD INJURY, INITIAL ENCOUNTER: ICD-10-CM

## 2021-10-10 DIAGNOSIS — T07.XXXA MULTIPLE ABRASIONS: ICD-10-CM
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
## 2021-10-10 LAB
ABO: NORMAL
ALBUMIN SERPL-MCNC: 4 G/DL (ref 3.5–5.1)
ALP BLD-CCNC: 67 U/L (ref 38–126)
ALT SERPL-CCNC: 26 U/L (ref 11–66)
AMPHETAMINE+METHAMPHETAMINE URINE SCREEN: NEGATIVE
ANION GAP SERPL CALCULATED.3IONS-SCNC: 13 MEQ/L (ref 8–16)
ANTIBODY SCREEN: NORMAL
APTT: 26.6 SECONDS (ref 22–38)
AST SERPL-CCNC: 25 U/L (ref 5–40)
BARBITURATE QUANTITATIVE URINE: NEGATIVE
BASOPHILS # BLD: 0.3 %
BASOPHILS ABSOLUTE: 0 THOU/MM3 (ref 0–0.1)
BENZODIAZEPINE QUANTITATIVE URINE: NEGATIVE
BILIRUB SERPL-MCNC: 0.4 MG/DL (ref 0.3–1.2)
BILIRUBIN URINE: NEGATIVE
BLOOD, URINE: NEGATIVE
BUN BLDV-MCNC: 11 MG/DL (ref 7–22)
CALCIUM SERPL-MCNC: 9.1 MG/DL (ref 8.5–10.5)
CANNABINOID QUANTITATIVE URINE: POSITIVE
CHARACTER, URINE: CLEAR
CHLORIDE BLD-SCNC: 105 MEQ/L (ref 98–111)
CO2: 22 MEQ/L (ref 23–33)
COCAINE METABOLITE QUANTITATIVE URINE: NEGATIVE
COLOR: YELLOW
CREAT SERPL-MCNC: 1 MG/DL (ref 0.4–1.2)
EKG ATRIAL RATE: 85 BPM
EKG P AXIS: 64 DEGREES
EKG P-R INTERVAL: 174 MS
EKG Q-T INTERVAL: 396 MS
EKG QRS DURATION: 116 MS
EKG QTC CALCULATION (BAZETT): 471 MS
EKG R AXIS: -36 DEGREES
EKG T AXIS: 73 DEGREES
EKG VENTRICULAR RATE: 85 BPM
EOSINOPHIL # BLD: 2.9 %
EOSINOPHILS ABSOLUTE: 0.2 THOU/MM3 (ref 0–0.4)
ERYTHROCYTE [DISTWIDTH] IN BLOOD BY AUTOMATED COUNT: 12.3 % (ref 11.5–14.5)
ERYTHROCYTE [DISTWIDTH] IN BLOOD BY AUTOMATED COUNT: 46.6 FL (ref 35–45)
ETHYL ALCOHOL, SERUM: 0.06 %
GFR SERPL CREATININE-BSD FRML MDRD: 87 ML/MIN/1.73M2
GLUCOSE BLD-MCNC: 116 MG/DL (ref 70–108)
GLUCOSE, URINE: NEGATIVE MG/DL
HCT VFR BLD CALC: 40.9 % (ref 42–52)
HEMOGLOBIN: 14.1 GM/DL (ref 14–18)
IMMATURE GRANS (ABS): 0.02 THOU/MM3 (ref 0–0.07)
IMMATURE GRANULOCYTES: 0.3 %
KETONES, URINE: NEGATIVE
LACTIC ACID: 2.3 MMOL/L (ref 0.5–2)
LEUKOCYTE EST, POC: NEGATIVE
LYMPHOCYTES # BLD: 22.4 %
LYMPHOCYTES ABSOLUTE: 1.6 THOU/MM3 (ref 1–4.8)
MCH RBC QN AUTO: 35.3 PG (ref 26–33)
MCHC RBC AUTO-ENTMCNC: 34.5 GM/DL (ref 32.2–35.5)
MCV RBC AUTO: 102.5 FL (ref 80–94)
MONOCYTES # BLD: 6.3 %
MONOCYTES ABSOLUTE: 0.5 THOU/MM3 (ref 0.4–1.3)
NITRITE, URINE: NEGATIVE
NUCLEATED RED BLOOD CELLS: 0 /100 WBC
OPIATES, URINE: NEGATIVE
OSMOLALITY CALCULATION: 279.8 MOSMOL/KG (ref 275–300)
OXYCODONE: NEGATIVE
PH UA: 6 (ref 5–9)
PHENCYCLIDINE QUANTITATIVE URINE: NEGATIVE
PLATELET # BLD: 261 THOU/MM3 (ref 130–400)
PMV BLD AUTO: 10.3 FL (ref 9.4–12.4)
POTASSIUM SERPL-SCNC: 3.7 MEQ/L (ref 3.5–5.2)
PROTEIN UA: NEGATIVE MG/DL
RBC # BLD: 3.99 MILL/MM3 (ref 4.7–6.1)
RH FACTOR: NORMAL
SEG NEUTROPHILS: 67.8 %
SEGMENTED NEUTROPHILS ABSOLUTE COUNT: 4.9 THOU/MM3 (ref 1.8–7.7)
SODIUM BLD-SCNC: 140 MEQ/L (ref 135–145)
SPECIFIC GRAVITY UA: 1.01 (ref 1–1.03)
TOTAL PROTEIN: 6.5 G/DL (ref 6.1–8)
UROBILINOGEN, URINE: 0.2 EU/DL (ref 0–1)
WBC # BLD: 7.3 THOU/MM3 (ref 4.8–10.8)

## 2021-10-10 PROCEDURE — 81003 URINALYSIS AUTO W/O SCOPE: CPT

## 2021-10-10 PROCEDURE — 96365 THER/PROPH/DIAG IV INF INIT: CPT

## 2021-10-10 PROCEDURE — APPNB30 APP NON BILLABLE TIME 0-30 MINS: Performed by: PHYSICIAN ASSISTANT

## 2021-10-10 PROCEDURE — 6370000000 HC RX 637 (ALT 250 FOR IP): Performed by: EMERGENCY MEDICINE

## 2021-10-10 PROCEDURE — 99223 1ST HOSP IP/OBS HIGH 75: CPT | Performed by: SURGERY

## 2021-10-10 PROCEDURE — 71045 X-RAY EXAM CHEST 1 VIEW: CPT

## 2021-10-10 PROCEDURE — APPSS180 APP SPLIT SHARED TIME > 60 MINUTES: Performed by: PHYSICIAN ASSISTANT

## 2021-10-10 PROCEDURE — 85730 THROMBOPLASTIN TIME PARTIAL: CPT

## 2021-10-10 PROCEDURE — 93005 ELECTROCARDIOGRAM TRACING: CPT | Performed by: EMERGENCY MEDICINE

## 2021-10-10 PROCEDURE — 70450 CT HEAD/BRAIN W/O DYE: CPT

## 2021-10-10 PROCEDURE — 76376 3D RENDER W/INTRP POSTPROCES: CPT

## 2021-10-10 PROCEDURE — 96375 TX/PRO/DX INJ NEW DRUG ADDON: CPT

## 2021-10-10 PROCEDURE — 73070 X-RAY EXAM OF ELBOW: CPT

## 2021-10-10 PROCEDURE — 99285 EMERGENCY DEPT VISIT HI MDM: CPT

## 2021-10-10 PROCEDURE — 86900 BLOOD TYPING SEROLOGIC ABO: CPT

## 2021-10-10 PROCEDURE — G0378 HOSPITAL OBSERVATION PER HR: HCPCS

## 2021-10-10 PROCEDURE — 6360000002 HC RX W HCPCS: Performed by: PHYSICIAN ASSISTANT

## 2021-10-10 PROCEDURE — 80053 COMPREHEN METABOLIC PANEL: CPT

## 2021-10-10 PROCEDURE — 83605 ASSAY OF LACTIC ACID: CPT

## 2021-10-10 PROCEDURE — 6360000002 HC RX W HCPCS

## 2021-10-10 PROCEDURE — 85025 COMPLETE CBC W/AUTO DIFF WBC: CPT

## 2021-10-10 PROCEDURE — 82077 ASSAY SPEC XCP UR&BREATH IA: CPT

## 2021-10-10 PROCEDURE — 86850 RBC ANTIBODY SCREEN: CPT

## 2021-10-10 PROCEDURE — 74177 CT ABD & PELVIS W/CONTRAST: CPT

## 2021-10-10 PROCEDURE — 6360000004 HC RX CONTRAST MEDICATION: Performed by: EMERGENCY MEDICINE

## 2021-10-10 PROCEDURE — 72125 CT NECK SPINE W/O DYE: CPT

## 2021-10-10 PROCEDURE — 71260 CT THORAX DX C+: CPT

## 2021-10-10 PROCEDURE — 80307 DRUG TEST PRSMV CHEM ANLYZR: CPT

## 2021-10-10 PROCEDURE — 6820000002 HC L2 INJURY CALL ACTIVATION: Performed by: SURGERY

## 2021-10-10 PROCEDURE — 36415 COLL VENOUS BLD VENIPUNCTURE: CPT

## 2021-10-10 PROCEDURE — 2580000003 HC RX 258: Performed by: PHYSICIAN ASSISTANT

## 2021-10-10 PROCEDURE — 86901 BLOOD TYPING SEROLOGIC RH(D): CPT

## 2021-10-10 RX ORDER — LIDOCAINE HYDROCHLORIDE 20 MG/ML
15 SOLUTION OROPHARYNGEAL ONCE
Status: COMPLETED | OUTPATIENT
Start: 2021-10-10 | End: 2021-10-10

## 2021-10-10 RX ORDER — ONDANSETRON 4 MG/1
4 TABLET, ORALLY DISINTEGRATING ORAL EVERY 8 HOURS PRN
Status: DISCONTINUED | OUTPATIENT
Start: 2021-10-10 | End: 2021-10-12 | Stop reason: HOSPADM

## 2021-10-10 RX ORDER — ONDANSETRON 2 MG/ML
4 INJECTION INTRAMUSCULAR; INTRAVENOUS EVERY 6 HOURS PRN
Status: DISCONTINUED | OUTPATIENT
Start: 2021-10-10 | End: 2021-10-12 | Stop reason: HOSPADM

## 2021-10-10 RX ORDER — SODIUM CHLORIDE 9 MG/ML
25 INJECTION, SOLUTION INTRAVENOUS PRN
Status: DISCONTINUED | OUTPATIENT
Start: 2021-10-10 | End: 2021-10-12 | Stop reason: HOSPADM

## 2021-10-10 RX ORDER — OXYCODONE HYDROCHLORIDE 5 MG/1
10 TABLET ORAL EVERY 4 HOURS PRN
Status: DISCONTINUED | OUTPATIENT
Start: 2021-10-10 | End: 2021-10-10

## 2021-10-10 RX ORDER — MORPHINE SULFATE 2 MG/ML
2 INJECTION, SOLUTION INTRAMUSCULAR; INTRAVENOUS
Status: DISCONTINUED | OUTPATIENT
Start: 2021-10-10 | End: 2021-10-12 | Stop reason: HOSPADM

## 2021-10-10 RX ORDER — SODIUM CHLORIDE 0.9 % (FLUSH) 0.9 %
5-40 SYRINGE (ML) INJECTION EVERY 12 HOURS SCHEDULED
Status: DISCONTINUED | OUTPATIENT
Start: 2021-10-10 | End: 2021-10-12 | Stop reason: HOSPADM

## 2021-10-10 RX ORDER — DULOXETIN HYDROCHLORIDE 30 MG/1
90 CAPSULE, DELAYED RELEASE ORAL NIGHTLY
COMMUNITY

## 2021-10-10 RX ORDER — OXYCODONE HYDROCHLORIDE 5 MG/1
10 TABLET ORAL EVERY 4 HOURS PRN
Status: DISCONTINUED | OUTPATIENT
Start: 2021-10-10 | End: 2021-10-12 | Stop reason: HOSPADM

## 2021-10-10 RX ORDER — MORPHINE SULFATE 4 MG/ML
4 INJECTION, SOLUTION INTRAMUSCULAR; INTRAVENOUS
Status: DISCONTINUED | OUTPATIENT
Start: 2021-10-10 | End: 2021-10-12 | Stop reason: HOSPADM

## 2021-10-10 RX ORDER — CEFAZOLIN SODIUM 2 G/100ML
2000 INJECTION, SOLUTION INTRAVENOUS ONCE
Status: COMPLETED | OUTPATIENT
Start: 2021-10-10 | End: 2021-10-10

## 2021-10-10 RX ORDER — BISACODYL 10 MG
10 SUPPOSITORY, RECTAL RECTAL DAILY
Status: DISCONTINUED | OUTPATIENT
Start: 2021-10-11 | End: 2021-10-12 | Stop reason: HOSPADM

## 2021-10-10 RX ORDER — CEFAZOLIN SODIUM 2 G/100ML
INJECTION, SOLUTION INTRAVENOUS
Status: COMPLETED
Start: 2021-10-10 | End: 2021-10-10

## 2021-10-10 RX ORDER — LIDOCAINE HYDROCHLORIDE AND EPINEPHRINE 10; 10 MG/ML; UG/ML
INJECTION, SOLUTION INFILTRATION; PERINEURAL
Status: DISPENSED
Start: 2021-10-10 | End: 2021-10-11

## 2021-10-10 RX ORDER — OXYCODONE HYDROCHLORIDE 5 MG/1
5 TABLET ORAL EVERY 4 HOURS PRN
Status: DISCONTINUED | OUTPATIENT
Start: 2021-10-10 | End: 2021-10-10

## 2021-10-10 RX ORDER — ONDANSETRON 2 MG/ML
INJECTION INTRAMUSCULAR; INTRAVENOUS
Status: COMPLETED
Start: 2021-10-10 | End: 2021-10-10

## 2021-10-10 RX ORDER — POLYETHYLENE GLYCOL 3350 17 G/17G
17 POWDER, FOR SOLUTION ORAL DAILY
Status: DISCONTINUED | OUTPATIENT
Start: 2021-10-11 | End: 2021-10-12 | Stop reason: HOSPADM

## 2021-10-10 RX ORDER — SODIUM CHLORIDE 0.9 % (FLUSH) 0.9 %
5-40 SYRINGE (ML) INJECTION PRN
Status: DISCONTINUED | OUTPATIENT
Start: 2021-10-10 | End: 2021-10-12 | Stop reason: HOSPADM

## 2021-10-10 RX ORDER — SODIUM PHOSPHATE, DIBASIC AND SODIUM PHOSPHATE, MONOBASIC 7; 19 G/133ML; G/133ML
1 ENEMA RECTAL DAILY PRN
Status: DISCONTINUED | OUTPATIENT
Start: 2021-10-10 | End: 2021-10-12 | Stop reason: HOSPADM

## 2021-10-10 RX ORDER — FAMOTIDINE 20 MG/1
20 TABLET, FILM COATED ORAL 2 TIMES DAILY
COMMUNITY

## 2021-10-10 RX ORDER — FAMOTIDINE 20 MG/1
20 TABLET, FILM COATED ORAL 2 TIMES DAILY
Status: DISCONTINUED | OUTPATIENT
Start: 2021-10-10 | End: 2021-10-12 | Stop reason: HOSPADM

## 2021-10-10 RX ORDER — OXYCODONE HYDROCHLORIDE 5 MG/1
5 TABLET ORAL EVERY 4 HOURS PRN
Status: DISCONTINUED | OUTPATIENT
Start: 2021-10-10 | End: 2021-10-12 | Stop reason: HOSPADM

## 2021-10-10 RX ADMIN — IOPAMIDOL 80 ML: 755 INJECTION, SOLUTION INTRAVENOUS at 19:09

## 2021-10-10 RX ADMIN — MORPHINE SULFATE 4 MG: 4 INJECTION, SOLUTION INTRAMUSCULAR; INTRAVENOUS at 23:42

## 2021-10-10 RX ADMIN — CEFAZOLIN SODIUM 2000 MG: 2 INJECTION, SOLUTION INTRAVENOUS at 18:51

## 2021-10-10 RX ADMIN — ONDANSETRON: 2 INJECTION INTRAMUSCULAR; INTRAVENOUS at 18:52

## 2021-10-10 RX ADMIN — SODIUM CHLORIDE, PRESERVATIVE FREE 10 ML: 5 INJECTION INTRAVENOUS at 23:44

## 2021-10-10 RX ADMIN — Medication 15 ML: at 21:11

## 2021-10-10 ASSESSMENT — PAIN DESCRIPTION - DESCRIPTORS
DESCRIPTORS: BURNING
DESCRIPTORS: BURNING

## 2021-10-10 ASSESSMENT — ENCOUNTER SYMPTOMS
EYE PAIN: 0
DIARRHEA: 0
CONSTIPATION: 0
SHORTNESS OF BREATH: 0
COUGH: 0
PHOTOPHOBIA: 0
SORE THROAT: 0
BACK PAIN: 0
ABDOMINAL DISTENTION: 0
VOMITING: 0
NAUSEA: 0
SINUS PAIN: 0
SINUS PRESSURE: 0
ABDOMINAL PAIN: 0

## 2021-10-10 ASSESSMENT — PAIN DESCRIPTION - LOCATION: LOCATION: GENERALIZED

## 2021-10-10 ASSESSMENT — PAIN DESCRIPTION - ORIENTATION
ORIENTATION: LEFT
ORIENTATION: LEFT

## 2021-10-10 ASSESSMENT — PAIN DESCRIPTION - PROGRESSION
CLINICAL_PROGRESSION: NOT CHANGED
CLINICAL_PROGRESSION: NOT CHANGED

## 2021-10-10 ASSESSMENT — PAIN DESCRIPTION - DIRECTION: RADIATING_TOWARDS: ALL OVER

## 2021-10-10 ASSESSMENT — PAIN - FUNCTIONAL ASSESSMENT: PAIN_FUNCTIONAL_ASSESSMENT: ACTIVITIES ARE NOT PREVENTED

## 2021-10-10 ASSESSMENT — PAIN DESCRIPTION - PAIN TYPE
TYPE: ACUTE PAIN
TYPE: ACUTE PAIN

## 2021-10-10 ASSESSMENT — PAIN SCALES - GENERAL
PAINLEVEL_OUTOF10: 7
PAINLEVEL_OUTOF10: 6

## 2021-10-10 ASSESSMENT — PAIN DESCRIPTION - FREQUENCY
FREQUENCY: CONTINUOUS
FREQUENCY: CONTINUOUS

## 2021-10-10 NOTE — ED NOTES
Bed: 003A  Expected date: 10/10/21  Expected time: 6:06 PM  Means of arrival: [facility]  Comments:     Jeanne Jacinto RN  10/10/21 4382

## 2021-10-10 NOTE — ED NOTES
Patient arrived back to ER room 3 in stable condition at this time.       Wendie Rivas RN  10/10/21 1534
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
## 2021-10-11 LAB — GLUCOSE BLD-MCNC: 128 MG/DL (ref 70–108)

## 2021-10-11 PROCEDURE — 92523 SPEECH SOUND LANG COMPREHEN: CPT

## 2021-10-11 PROCEDURE — 6360000002 HC RX W HCPCS: Performed by: PHYSICIAN ASSISTANT

## 2021-10-11 PROCEDURE — 6370000000 HC RX 637 (ALT 250 FOR IP): Performed by: PHYSICIAN ASSISTANT

## 2021-10-11 PROCEDURE — 2580000003 HC RX 258: Performed by: PHYSICIAN ASSISTANT

## 2021-10-11 PROCEDURE — APPSS60 APP SPLIT SHARED TIME 46-60 MINUTES: Performed by: NURSE PRACTITIONER

## 2021-10-11 PROCEDURE — 6370000000 HC RX 637 (ALT 250 FOR IP): Performed by: SURGERY

## 2021-10-11 PROCEDURE — 82948 REAGENT STRIP/BLOOD GLUCOSE: CPT

## 2021-10-11 PROCEDURE — 6370000000 HC RX 637 (ALT 250 FOR IP): Performed by: NURSE PRACTITIONER

## 2021-10-11 PROCEDURE — 96376 TX/PRO/DX INJ SAME DRUG ADON: CPT

## 2021-10-11 PROCEDURE — 1200000000 HC SEMI PRIVATE

## 2021-10-11 PROCEDURE — 97129 THER IVNTJ 1ST 15 MIN: CPT

## 2021-10-11 RX ORDER — AMLODIPINE BESYLATE 10 MG/1
10 TABLET ORAL DAILY
Status: DISCONTINUED | OUTPATIENT
Start: 2021-10-11 | End: 2021-10-12 | Stop reason: HOSPADM

## 2021-10-11 RX ORDER — SPIRONOLACTONE 25 MG/1
25 TABLET ORAL DAILY
Status: DISCONTINUED | OUTPATIENT
Start: 2021-10-11 | End: 2021-10-12 | Stop reason: HOSPADM

## 2021-10-11 RX ORDER — METOPROLOL TARTRATE 50 MG/1
50 TABLET, FILM COATED ORAL 2 TIMES DAILY
Status: DISCONTINUED | OUTPATIENT
Start: 2021-10-11 | End: 2021-10-12 | Stop reason: HOSPADM

## 2021-10-11 RX ORDER — NITROGLYCERIN 0.4 MG/1
0.4 TABLET SUBLINGUAL EVERY 5 MIN PRN
Status: DISCONTINUED | OUTPATIENT
Start: 2021-10-11 | End: 2021-10-12 | Stop reason: HOSPADM

## 2021-10-11 RX ORDER — GABAPENTIN 100 MG/1
100 CAPSULE ORAL 3 TIMES DAILY
Status: DISCONTINUED | OUTPATIENT
Start: 2021-10-11 | End: 2021-10-12 | Stop reason: HOSPADM

## 2021-10-11 RX ORDER — LISINOPRIL 5 MG/1
5 TABLET ORAL DAILY
Status: DISCONTINUED | OUTPATIENT
Start: 2021-10-11 | End: 2021-10-12 | Stop reason: HOSPADM

## 2021-10-11 RX ORDER — RANOLAZINE 500 MG/1
1000 TABLET, EXTENDED RELEASE ORAL 2 TIMES DAILY
Status: DISCONTINUED | OUTPATIENT
Start: 2021-10-11 | End: 2021-10-12 | Stop reason: HOSPADM

## 2021-10-11 RX ORDER — ISOSORBIDE MONONITRATE 60 MG/1
120 TABLET, EXTENDED RELEASE ORAL DAILY
Status: DISCONTINUED | OUTPATIENT
Start: 2021-10-11 | End: 2021-10-12 | Stop reason: HOSPADM

## 2021-10-11 RX ORDER — ALLOPURINOL 100 MG/1
100 TABLET ORAL DAILY
Status: DISCONTINUED | OUTPATIENT
Start: 2021-10-11 | End: 2021-10-12 | Stop reason: HOSPADM

## 2021-10-11 RX ADMIN — OXYCODONE 10 MG: 5 TABLET ORAL at 02:48

## 2021-10-11 RX ADMIN — RANOLAZINE 1000 MG: 500 TABLET, FILM COATED, EXTENDED RELEASE ORAL at 12:23

## 2021-10-11 RX ADMIN — MORPHINE SULFATE 4 MG: 4 INJECTION, SOLUTION INTRAMUSCULAR; INTRAVENOUS at 18:36

## 2021-10-11 RX ADMIN — FAMOTIDINE 20 MG: 20 TABLET, FILM COATED ORAL at 12:24

## 2021-10-11 RX ADMIN — OXYCODONE 10 MG: 5 TABLET ORAL at 13:23

## 2021-10-11 RX ADMIN — SODIUM CHLORIDE, PRESERVATIVE FREE 10 ML: 5 INJECTION INTRAVENOUS at 10:37

## 2021-10-11 RX ADMIN — MORPHINE SULFATE 4 MG: 4 INJECTION, SOLUTION INTRAMUSCULAR; INTRAVENOUS at 07:57

## 2021-10-11 RX ADMIN — MORPHINE SULFATE 4 MG: 4 INJECTION, SOLUTION INTRAMUSCULAR; INTRAVENOUS at 03:50

## 2021-10-11 RX ADMIN — MORPHINE SULFATE 4 MG: 4 INJECTION, SOLUTION INTRAMUSCULAR; INTRAVENOUS at 16:27

## 2021-10-11 RX ADMIN — MORPHINE SULFATE 4 MG: 4 INJECTION, SOLUTION INTRAMUSCULAR; INTRAVENOUS at 14:26

## 2021-10-11 RX ADMIN — MORPHINE SULFATE 4 MG: 4 INJECTION, SOLUTION INTRAMUSCULAR; INTRAVENOUS at 12:24

## 2021-10-11 RX ADMIN — LISINOPRIL 5 MG: 5 TABLET ORAL at 12:24

## 2021-10-11 RX ADMIN — DULOXETINE HYDROCHLORIDE 90 MG: 60 CAPSULE, DELAYED RELEASE ORAL at 20:50

## 2021-10-11 RX ADMIN — OXYCODONE 10 MG: 5 TABLET ORAL at 21:37

## 2021-10-11 RX ADMIN — SODIUM CHLORIDE, PRESERVATIVE FREE 5 ML: 5 INJECTION INTRAVENOUS at 20:50

## 2021-10-11 RX ADMIN — MORPHINE SULFATE 4 MG: 4 INJECTION, SOLUTION INTRAMUSCULAR; INTRAVENOUS at 20:36

## 2021-10-11 RX ADMIN — AMLODIPINE BESYLATE 10 MG: 10 TABLET ORAL at 12:24

## 2021-10-11 RX ADMIN — OXYCODONE 10 MG: 5 TABLET ORAL at 17:32

## 2021-10-11 RX ADMIN — SPIRONOLACTONE 25 MG: 25 TABLET ORAL at 12:23

## 2021-10-11 RX ADMIN — MORPHINE SULFATE 4 MG: 4 INJECTION, SOLUTION INTRAMUSCULAR; INTRAVENOUS at 06:00

## 2021-10-11 RX ADMIN — MORPHINE SULFATE 4 MG: 4 INJECTION, SOLUTION INTRAMUSCULAR; INTRAVENOUS at 01:43

## 2021-10-11 RX ADMIN — MORPHINE SULFATE 4 MG: 4 INJECTION, SOLUTION INTRAMUSCULAR; INTRAVENOUS at 22:39

## 2021-10-11 RX ADMIN — RANOLAZINE 1000 MG: 500 TABLET, FILM COATED, EXTENDED RELEASE ORAL at 20:50

## 2021-10-11 RX ADMIN — ALLOPURINOL 100 MG: 100 TABLET ORAL at 12:23

## 2021-10-11 RX ADMIN — OXYCODONE 10 MG: 5 TABLET ORAL at 06:03

## 2021-10-11 RX ADMIN — FAMOTIDINE 20 MG: 20 TABLET, FILM COATED ORAL at 20:50

## 2021-10-11 RX ADMIN — MORPHINE SULFATE 4 MG: 4 INJECTION, SOLUTION INTRAMUSCULAR; INTRAVENOUS at 10:24

## 2021-10-11 RX ADMIN — METOPROLOL TARTRATE 50 MG: 50 TABLET, FILM COATED ORAL at 12:23

## 2021-10-11 RX ADMIN — ISOSORBIDE MONONITRATE 120 MG: 60 TABLET ORAL at 12:24

## 2021-10-11 RX ADMIN — GABAPENTIN 100 MG: 100 CAPSULE ORAL at 17:08

## 2021-10-11 ASSESSMENT — PAIN SCALES - GENERAL
PAINLEVEL_OUTOF10: 7
PAINLEVEL_OUTOF10: 7
PAINLEVEL_OUTOF10: 8
PAINLEVEL_OUTOF10: 7
PAINLEVEL_OUTOF10: 7
PAINLEVEL_OUTOF10: 8
PAINLEVEL_OUTOF10: 0
PAINLEVEL_OUTOF10: 8
PAINLEVEL_OUTOF10: 8
PAINLEVEL_OUTOF10: 7
PAINLEVEL_OUTOF10: 7
PAINLEVEL_OUTOF10: 0
PAINLEVEL_OUTOF10: 8
PAINLEVEL_OUTOF10: 7
PAINLEVEL_OUTOF10: 0
PAINLEVEL_OUTOF10: 9
PAINLEVEL_OUTOF10: 7
PAINLEVEL_OUTOF10: 9

## 2021-10-11 ASSESSMENT — PAIN DESCRIPTION - PAIN TYPE
TYPE: ACUTE PAIN

## 2021-10-11 ASSESSMENT — PAIN DESCRIPTION - PROGRESSION
CLINICAL_PROGRESSION: GRADUALLY WORSENING
CLINICAL_PROGRESSION: RAPIDLY WORSENING
CLINICAL_PROGRESSION: GRADUALLY IMPROVING
CLINICAL_PROGRESSION: GRADUALLY IMPROVING
CLINICAL_PROGRESSION: NOT CHANGED

## 2021-10-11 ASSESSMENT — PAIN DESCRIPTION - DESCRIPTORS
DESCRIPTORS: BURNING

## 2021-10-11 ASSESSMENT — PAIN DESCRIPTION - LOCATION
LOCATION: GENERALIZED

## 2021-10-11 ASSESSMENT — PAIN DESCRIPTION - DIRECTION
RADIATING_TOWARDS: ALL OVER
RADIATING_TOWARDS: ALL
RADIATING_TOWARDS: ALL OVER

## 2021-10-11 ASSESSMENT — PAIN DESCRIPTION - FREQUENCY
FREQUENCY: CONTINUOUS

## 2021-10-11 ASSESSMENT — PAIN DESCRIPTION - ORIENTATION
ORIENTATION: LEFT

## 2021-10-11 ASSESSMENT — PAIN - FUNCTIONAL ASSESSMENT
PAIN_FUNCTIONAL_ASSESSMENT: PREVENTS OR INTERFERES SOME ACTIVE ACTIVITIES AND ADLS
PAIN_FUNCTIONAL_ASSESSMENT: ACTIVITIES ARE NOT PREVENTED
PAIN_FUNCTIONAL_ASSESSMENT: PREVENTS OR INTERFERES SOME ACTIVE ACTIVITIES AND ADLS

## 2021-10-11 NOTE — ED NOTES
Pt given urinal to provide sample. Denies other needs. Family at side. Call light in reach.       Alexei Austin RN  10/10/21 2019
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
## 2021-10-11 NOTE — ED NOTES
Pt resting in bed. Resp regular. Denies all needs. Family at side. Call light in reach.       Margarito Pena RN  10/10/21 1778

## 2021-10-11 NOTE — PROGRESS NOTES
Kar[facility]cal 59  PHYSICAL THERAPY MISSED TREATMENT NOTE  [facility] MED 5K    Date: 10/11/2021  Patient Name: Roberto Carlos Lamb        MRN: [id number]   : 1990  (31 y.o.)  Gender: male                REASON FOR MISSED TREATMENT:  Hold treatment per nursing request.  Pt on strict bedrest, awaiting ortho spine consult, will check back as pt medically appropriate.      Franklin Mendoza PT, DPT
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
## 2021-10-11 NOTE — ED NOTES
Patient transferred to [facility] room 021 nurse informed       Jade Millan  10/10/21 4888

## 2021-10-11 NOTE — PROGRESS NOTES
Pt admitted to  5K21 via stretcher from ED. Complaints: pain all over. 18g INT left hand. 18g INT right AC. No fluids infusing. IV sites free of s/s of infection or infiltration. Vital signs obtained. Assessment and data collection initiated. Two nurse skin assessment performed by Hernán Eason and Pratima CHILDERS. Patient oriented to room and call light. Policies and procedures for 5K explained. All questions answered with no further questions at this time. Fall prevention and safety brochure discussed with patient. Bed alarm on. Call light in reach. Family (mom and dad) at bedside.

## 2021-10-11 NOTE — FLOWSHEET NOTE
10/11/21 0608   Provider Notification   Reason for Communication Evaluate   Provider Name Harjeet Estrada   Provider Notification Advance Practice Clinician (CNS/NP/CNM/CRNA/PA)   Method of Communication Secure Message   Response See orders   Notification Time [phone]     Message sent asking about resuming home medication. PA stated she would place own orders.

## 2021-10-11 NOTE — ED NOTES
Assumed pt care at this time. Pt resting in bed. Resp regular. Denies needs. Call light in reach.       Cornelius Lawson RN  10/10/21 2006

## 2021-10-12 VITALS
OXYGEN SATURATION: 94 % | DIASTOLIC BLOOD PRESSURE: 66 MMHG | SYSTOLIC BLOOD PRESSURE: 117 MMHG | TEMPERATURE: 98.6 F | HEIGHT: 66 IN | WEIGHT: 160 LBS | RESPIRATION RATE: 16 BRPM | HEART RATE: 89 BPM | BODY MASS INDEX: 25.71 KG/M2

## 2021-10-12 PROCEDURE — 6370000000 HC RX 637 (ALT 250 FOR IP): Performed by: PHYSICIAN ASSISTANT

## 2021-10-12 PROCEDURE — 6370000000 HC RX 637 (ALT 250 FOR IP): Performed by: NURSE PRACTITIONER

## 2021-10-12 PROCEDURE — 97166 OT EVAL MOD COMPLEX 45 MIN: CPT

## 2021-10-12 PROCEDURE — 99231 SBSQ HOSP IP/OBS SF/LOW 25: CPT | Performed by: SURGERY

## 2021-10-12 PROCEDURE — 6360000002 HC RX W HCPCS: Performed by: PHYSICIAN ASSISTANT

## 2021-10-12 PROCEDURE — 6370000000 HC RX 637 (ALT 250 FOR IP): Performed by: SURGERY

## 2021-10-12 PROCEDURE — 2580000003 HC RX 258: Performed by: PHYSICIAN ASSISTANT

## 2021-10-12 PROCEDURE — APPSS45 APP SPLIT SHARED TIME 31-45 MINUTES: Performed by: PHYSICIAN ASSISTANT

## 2021-10-12 RX ORDER — BACITRACIN, NEOMYCIN, POLYMYXIN B 400; 3.5; 5 [USP'U]/G; MG/G; [USP'U]/G
OINTMENT TOPICAL 2 TIMES DAILY
Status: DISCONTINUED | OUTPATIENT
Start: 2021-10-12 | End: 2021-10-12 | Stop reason: HOSPADM

## 2021-10-12 RX ORDER — BACITRACIN, NEOMYCIN, POLYMYXIN B 400; 3.5; 5 [USP'U]/G; MG/G; [USP'U]/G
OINTMENT TOPICAL
COMMUNITY
Start: 2021-10-12 | End: 2021-10-22

## 2021-10-12 RX ORDER — OXYCODONE HYDROCHLORIDE 5 MG/1
5 TABLET ORAL EVERY 6 HOURS PRN
Qty: 20 TABLET | Refills: 0 | Status: SHIPPED | OUTPATIENT
Start: 2021-10-12 | End: 2021-10-17

## 2021-10-12 RX ADMIN — MORPHINE SULFATE 4 MG: 4 INJECTION, SOLUTION INTRAMUSCULAR; INTRAVENOUS at 05:09

## 2021-10-12 RX ADMIN — METOPROLOL TARTRATE 50 MG: 50 TABLET, FILM COATED ORAL at 08:56

## 2021-10-12 RX ADMIN — ALLOPURINOL 100 MG: 100 TABLET ORAL at 08:56

## 2021-10-12 RX ADMIN — GABAPENTIN 100 MG: 100 CAPSULE ORAL at 13:35

## 2021-10-12 RX ADMIN — MORPHINE SULFATE 4 MG: 4 INJECTION, SOLUTION INTRAMUSCULAR; INTRAVENOUS at 09:16

## 2021-10-12 RX ADMIN — LISINOPRIL 5 MG: 5 TABLET ORAL at 08:56

## 2021-10-12 RX ADMIN — FAMOTIDINE 20 MG: 20 TABLET, FILM COATED ORAL at 08:56

## 2021-10-12 RX ADMIN — MORPHINE SULFATE 4 MG: 4 INJECTION, SOLUTION INTRAMUSCULAR; INTRAVENOUS at 00:50

## 2021-10-12 RX ADMIN — SPIRONOLACTONE 25 MG: 25 TABLET ORAL at 08:56

## 2021-10-12 RX ADMIN — OXYCODONE 10 MG: 5 TABLET ORAL at 10:07

## 2021-10-12 RX ADMIN — MORPHINE SULFATE 4 MG: 4 INJECTION, SOLUTION INTRAMUSCULAR; INTRAVENOUS at 03:01

## 2021-10-12 RX ADMIN — POLYETHYLENE GLYCOL 3350 17 G: 17 POWDER, FOR SOLUTION ORAL at 08:56

## 2021-10-12 RX ADMIN — MORPHINE SULFATE 4 MG: 4 INJECTION, SOLUTION INTRAMUSCULAR; INTRAVENOUS at 07:18

## 2021-10-12 RX ADMIN — BACITRACIN ZINC NEOMYCIN SULFATE POLYMYXIN B SULFATE: 400; 3.5; 5 OINTMENT TOPICAL at 10:58

## 2021-10-12 RX ADMIN — SODIUM CHLORIDE, PRESERVATIVE FREE 10 ML: 5 INJECTION INTRAVENOUS at 08:56

## 2021-10-12 RX ADMIN — OXYCODONE 10 MG: 5 TABLET ORAL at 06:10

## 2021-10-12 RX ADMIN — AMLODIPINE BESYLATE 10 MG: 10 TABLET ORAL at 08:56

## 2021-10-12 RX ADMIN — MORPHINE SULFATE 4 MG: 4 INJECTION, SOLUTION INTRAMUSCULAR; INTRAVENOUS at 11:25

## 2021-10-12 RX ADMIN — GABAPENTIN 100 MG: 100 CAPSULE ORAL at 08:56

## 2021-10-12 RX ADMIN — ISOSORBIDE MONONITRATE 120 MG: 60 TABLET ORAL at 08:56

## 2021-10-12 RX ADMIN — OXYCODONE 10 MG: 5 TABLET ORAL at 01:54

## 2021-10-12 RX ADMIN — MORPHINE SULFATE 2 MG: 2 INJECTION, SOLUTION INTRAMUSCULAR; INTRAVENOUS at 13:30

## 2021-10-12 RX ADMIN — RANOLAZINE 1000 MG: 500 TABLET, FILM COATED, EXTENDED RELEASE ORAL at 08:58

## 2021-10-12 ASSESSMENT — PAIN DESCRIPTION - LOCATION
LOCATION: ARM;HAND;HEAD
LOCATION: ARM

## 2021-10-12 ASSESSMENT — PAIN SCALES - GENERAL
PAINLEVEL_OUTOF10: 7
PAINLEVEL_OUTOF10: 7
PAINLEVEL_OUTOF10: 8
PAINLEVEL_OUTOF10: 7
PAINLEVEL_OUTOF10: 7
PAINLEVEL_OUTOF10: 5
PAINLEVEL_OUTOF10: 7
PAINLEVEL_OUTOF10: 8
PAINLEVEL_OUTOF10: 7
PAINLEVEL_OUTOF10: 5
PAINLEVEL_OUTOF10: 8

## 2021-10-12 ASSESSMENT — PAIN DESCRIPTION - ORIENTATION
ORIENTATION: LEFT
ORIENTATION: LEFT

## 2021-10-12 ASSESSMENT — PAIN DESCRIPTION - DESCRIPTORS: DESCRIPTORS: BURNING

## 2021-10-12 ASSESSMENT — ENCOUNTER SYMPTOMS
BACK PAIN: 1
ABDOMINAL PAIN: 0

## 2021-10-12 ASSESSMENT — PAIN DESCRIPTION - FREQUENCY: FREQUENCY: CONTINUOUS

## 2021-10-12 ASSESSMENT — PAIN DESCRIPTION - PAIN TYPE
TYPE: ACUTE PAIN
TYPE: ACUTE PAIN
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
## 2021-10-12 NOTE — DISCHARGE INSTR - DIET
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
## 2021-10-12 NOTE — PROGRESS NOTES
Discharge instructions given to patient and family, all questions answered. Dressed in own clothing. Discharge papers and script given to patient, copies in the chart. Transport requested. Chart placed in yellow discharge bin.
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
## 2021-10-12 NOTE — PROGRESS NOTES
Called PT to evaluate, patient is a possible discharge today. Stated they would try to see but not sure if he can be evaluated today or tomorrow.

## 2021-10-14 NOTE — CARE COORDINATION
Late entry, patient discharged to home 10/12/2021 with no services added/requested. 10/14/21, 11:33 AM EDT    Patient goals/plan/ treatment preferences discussed by  and . Patient goals/plan/ treatment preferences reviewed with patient/ family. Patient/ family verbalize understanding of discharge plan and are in agreement with goal/plan/treatment preferences. Understanding was demonstrated using the teach back method. AVS provided by RN at time of discharge, which includes all necessary medical information pertaining to the patients current course of illness, treatment, post-discharge goals of care, and treatment preferences. Discharge AVS faxed to [facility].    Electronically signed by González Stinson RN on 10/14/21 at 11:32 AM EDT

## 2021-10-15 ENCOUNTER — HOSPITAL ENCOUNTER (EMERGENCY)
Age: 31
Discharge: HOME | End: 2021-10-15
Payer: MEDICARE

## 2021-10-15 VITALS
SYSTOLIC BLOOD PRESSURE: 161 MMHG | DIASTOLIC BLOOD PRESSURE: 95 MMHG | OXYGEN SATURATION: 98 % | RESPIRATION RATE: 16 BRPM | HEART RATE: 87 BPM | HEIGHT: 66 IN | WEIGHT: 175 LBS | TEMPERATURE: 97.5 F | BODY MASS INDEX: 28.12 KG/M2

## 2021-10-15 DIAGNOSIS — T14.8XXA WOUND INFECTION: Primary | ICD-10-CM
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
## 2021-10-15 DIAGNOSIS — L08.9 WOUND INFECTION: Primary | ICD-10-CM

## 2021-10-15 PROCEDURE — 99213 OFFICE O/P EST LOW 20 MIN: CPT

## 2021-10-15 PROCEDURE — 99213 OFFICE O/P EST LOW 20 MIN: CPT | Performed by: NURSE PRACTITIONER

## 2021-10-15 RX ORDER — SULFAMETHOXAZOLE AND TRIMETHOPRIM 800; 160 MG/1; MG/1
1 TABLET ORAL 2 TIMES DAILY
Qty: 14 TABLET | Refills: 0 | Status: SHIPPED | OUTPATIENT
Start: 2021-10-15 | End: 2021-10-22

## 2021-10-15 RX ORDER — LISINOPRIL 5 MG/1
5 TABLET ORAL DAILY
COMMUNITY

## 2021-10-15 ASSESSMENT — PAIN DESCRIPTION - DESCRIPTORS: DESCRIPTORS: OTHER (COMMENT);SORE

## 2021-10-15 ASSESSMENT — ENCOUNTER SYMPTOMS
COUGH: 0
NAUSEA: 0
RHINORRHEA: 0
VOMITING: 0
DIARRHEA: 0
SHORTNESS OF BREATH: 0
CHEST TIGHTNESS: 0
SORE THROAT: 0

## 2021-10-15 ASSESSMENT — PAIN DESCRIPTION - ONSET: ONSET: SUDDEN

## 2021-10-15 ASSESSMENT — PAIN DESCRIPTION - ORIENTATION: ORIENTATION: LEFT

## 2021-10-15 ASSESSMENT — PAIN DESCRIPTION - LOCATION: LOCATION: ELBOW

## 2021-10-15 ASSESSMENT — PAIN DESCRIPTION - FREQUENCY: FREQUENCY: CONTINUOUS

## 2021-10-15 ASSESSMENT — PAIN - FUNCTIONAL ASSESSMENT: PAIN_FUNCTIONAL_ASSESSMENT: PREVENTS OR INTERFERES WITH MANY ACTIVE NOT PASSIVE ACTIVITIES

## 2021-10-15 ASSESSMENT — PAIN SCALES - GENERAL: PAINLEVEL_OUTOF10: 3

## 2021-10-15 ASSESSMENT — PAIN DESCRIPTION - PROGRESSION: CLINICAL_PROGRESSION: NOT CHANGED

## 2021-10-15 ASSESSMENT — PAIN DESCRIPTION - PAIN TYPE: TYPE: ACUTE PAIN
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
## 2021-10-17 NOTE — PROGRESS NOTES
Patient Name: King Jackie   Medical Record Number: [id number]  Date: 10/10/21    Room/Bed: 06/06  Laceration Repair Procedure Note  Indication: Laceration    Procedure: The patient was placed in the appropriate position and anesthesia around the laceration was obtained by infiltration using 1% Lidocaine with epinephrine. The area was then cleansed with Shur-Clens and draped in a sterile fashion. The laceration was closed with 4-0 Prolene using interrupted sutures. There were no additional lacerations requiring repair. The wound area was then dressed with a sterile dressing. Total repaired wound length: 1.5 cm. Other Items: Suture count: 5    The patient tolerated the procedure well.     Complications: None    Electronically signed by Panchito Urias PA-C on 10/16/2021 at 8:19 PM

## 2021-10-18 ENCOUNTER — HOSPITAL ENCOUNTER (OUTPATIENT)
Age: 31
Discharge: HOME | End: 2021-10-18
Payer: MEDICARE

## 2021-10-18 ENCOUNTER — HOSPITAL ENCOUNTER (OUTPATIENT)
Dept: GENERAL RADIOLOGY | Age: 31
Discharge: HOME | End: 2021-10-18
Payer: MEDICARE

## 2021-10-18 DIAGNOSIS — S69.91XA INJURY OF RIGHT HAND, INITIAL ENCOUNTER: ICD-10-CM

## 2021-10-18 PROCEDURE — 73130 X-RAY EXAM OF HAND: CPT

## 2022-10-14 ENCOUNTER — HOSPITAL ENCOUNTER (OUTPATIENT)
Dept: GENERAL RADIOLOGY | Age: 32
Discharge: HOME | End: 2022-10-14
Payer: MEDICARE

## 2022-10-14 ENCOUNTER — HOSPITAL ENCOUNTER (OUTPATIENT)
Age: 32
Discharge: HOME | End: 2022-10-14
Payer: MEDICARE

## 2022-10-14 DIAGNOSIS — M72.2 PLANTAR FASCIITIS OF RIGHT FOOT: ICD-10-CM

## 2022-10-14 PROCEDURE — 73630 X-RAY EXAM OF FOOT: CPT

## 2024-03-25 ENCOUNTER — HOSPITAL ENCOUNTER (OUTPATIENT)
Dept: MRI IMAGING | Age: 34
Discharge: HOME | End: 2024-03-25
Attending: INTERNAL MEDICINE
Payer: OTHER GOVERNMENT

## 2024-03-25 ENCOUNTER — HOSPITAL ENCOUNTER (OUTPATIENT)
Dept: GENERAL RADIOLOGY | Age: 34
Discharge: HOME | End: 2024-03-25
Attending: INTERNAL MEDICINE
Payer: OTHER GOVERNMENT

## 2024-03-25 DIAGNOSIS — M79.671 RIGHT FOOT PAIN: ICD-10-CM

## 2024-03-25 DIAGNOSIS — M54.6 PAIN IN THORACIC SPINE: ICD-10-CM

## 2024-03-25 PROCEDURE — 73630 X-RAY EXAM OF FOOT: CPT

## 2024-03-25 PROCEDURE — 72146 MRI CHEST SPINE W/O DYE: CPT

## 2024-03-25 PROCEDURE — 72148 MRI LUMBAR SPINE W/O DYE: CPT

## 2024-04-01 ENCOUNTER — TRANSCRIBE ORDERS (OUTPATIENT)
Dept: ADMINISTRATIVE | Age: 34
End: 2024-04-01

## 2024-04-01 DIAGNOSIS — M79.671 RIGHT FOOT PAIN: Primary | ICD-10-CM

## 2024-04-18 ENCOUNTER — HOSPITAL ENCOUNTER (OUTPATIENT)
Dept: MRI IMAGING | Age: 34
Discharge: HOME | End: 2024-04-18
Payer: OTHER GOVERNMENT

## 2024-04-18 DIAGNOSIS — M79.671 RIGHT FOOT PAIN: ICD-10-CM

## 2024-04-18 PROCEDURE — 73718 MRI LOWER EXTREMITY W/O DYE: CPT

## 2024-04-18 PROCEDURE — 73721 MRI JNT OF LWR EXTRE W/O DYE: CPT
